# Patient Record
Sex: FEMALE | Race: WHITE | Employment: FULL TIME | URBAN - METROPOLITAN AREA
[De-identification: names, ages, dates, MRNs, and addresses within clinical notes are randomized per-mention and may not be internally consistent; named-entity substitution may affect disease eponyms.]

---

## 2017-07-10 ENCOUNTER — OFFICE VISIT (OUTPATIENT)
Dept: FAMILY MEDICINE CLINIC | Age: 47
End: 2017-07-10

## 2017-07-10 VITALS
HEIGHT: 67 IN | OXYGEN SATURATION: 96 % | BODY MASS INDEX: 32.33 KG/M2 | WEIGHT: 206 LBS | DIASTOLIC BLOOD PRESSURE: 76 MMHG | RESPIRATION RATE: 18 BRPM | HEART RATE: 90 BPM | SYSTOLIC BLOOD PRESSURE: 144 MMHG | TEMPERATURE: 99 F

## 2017-07-10 DIAGNOSIS — E03.9 ACQUIRED HYPOTHYROIDISM: Primary | ICD-10-CM

## 2017-07-10 DIAGNOSIS — N91.2 AMENORRHEA: ICD-10-CM

## 2017-07-10 DIAGNOSIS — L23.7 POISON IVY: ICD-10-CM

## 2017-07-10 NOTE — PROGRESS NOTES
Jayme Beltran is a 50 y.o.  female and presents with    Chief Complaint   Patient presents with    Poison Ivy/Poison Oak/Poison Sumac Exposure    Thyroid Problem      al;so amenorrhea  Worried abouit possible prednancy        Subjective: Additional Concerns: 1. Worried abouit abnormal thyroid and weight gain    2. Skipped period this mo worried about pregnancy vs menopause    3. Has a few spots of poison ivy over 1 wk old           Patient Active Problem List    Diagnosis Date Noted    Hypothyroid 10/17/2011    Depression 10/17/2011    Genital herpes 10/17/2011     Current Outpatient Prescriptions   Medication Sig Dispense Refill    zolpidem (AMBIEN) 10 mg tablet Take 1 Tab by mouth nightly as needed for Sleep. Max Daily Amount: 10 mg. 30 Tab 5    sertraline (ZOLOFT) 100 mg tablet Take 1 Tab by mouth daily. 90 Tab 4    levothyroxine (SYNTHROID) 137 mcg tablet take 1 tablet by mouth once daily BEFORE BREAKFAST 90 Tab 4     No Known Allergies  Past Medical History:   Diagnosis Date    Depression 10/17/2011    Hypothyroid 10/17/2011     No past surgical history on file. Family History   Problem Relation Age of Onset    Heart Disease Mother     Hypertension Mother      Social History   Substance Use Topics    Smoking status: Never Smoker    Smokeless tobacco: Not on file    Alcohol use Yes       ROS       All other systems reviewed and are negative.       Objective:  Vitals:    07/10/17 1333   BP: 144/76   Pulse: 90   Resp: 18   Temp: 99 °F (37.2 °C)   TempSrc: Oral   SpO2: 96%   Weight: 206 lb (93.4 kg)   Height: 5' 7\" (1.702 m)   PainSc:   0 - No pain                 alert, well appearing, and in no distress, oriented to person, place, and time and normal appearing weight  Chest - clear to auscultation, no wheezes, rales or rhonchi, symmetric air entry  Heart - normal rate, regular rhythm, normal S1, S2, no murmurs, rubs, clicks or gallops  Skin - a coupelf of small streaks LABS     TESTS      Assessment/Plan:    Thyroid check labs -- note she is paying for brand name synthoird    menpause vs preg will check labs    poisoin ivy observation only     Lab review:       I have discussed the diagnosis with the patient and the intended plan as seen in the above orders. The patient has received an after-visit summary and questions were answered concerning future plans. I have discussed medication side effects and warnings with the patient as well. I have reviewed the plan of care with the patient, accepted their input and they are in agreement with the treatment goals.      Follow-up Disposition: Not on File

## 2017-07-10 NOTE — MR AVS SNAPSHOT
Visit Information Date & Time Provider Department Dept. Phone Encounter #  
 7/10/2017  1:30 PM Janice Ferrer [phone] [id number] Follow-up Instructions Return if symptoms worsen or fail to improve. Upcoming Health Maintenance Date Due INFLUENZA AGE 9 TO ADULT 8/1/2017 PAP AKA CERVICAL CYTOLOGY 10/13/2018 DTaP/Tdap/Td series (2 - Td) 10/17/2021 Allergies as of 7/10/2017  Review Complete On: 7/10/2017 By: Rosa Jones., DO No Known Allergies Current Immunizations  Reviewed on 10/17/2011 Name Date TDAP Vaccine 10/17/2011 Not reviewed this visit You Were Diagnosed With   
  
 Codes Comments Acquired hypothyroidism    -  Primary ICD-10-CM: E03.9 ICD-9-CM: 244.9 Poison ivy     ICD-10-CM: L23.7 ICD-9-CM: 692.6 Amenorrhea     ICD-10-CM: N91.2 ICD-9-CM: 626.0 Vitals BP Pulse Temp Resp Height(growth percentile) Weight(growth percentile) 144/76 (BP 1 Location: Right arm, BP Patient Position: Sitting) 90 99 °F (37.2 °C) (Oral) 18 5' 7\" (1.702 m) 206 lb (93.4 kg) SpO2 BMI OB Status Smoking Status 96% 32.26 kg/m2 Having regular periods Never Smoker BMI and BSA Data Body Mass Index Body Surface Area  
 32.26 kg/m 2 2.1 m 2 Preferred Pharmacy Pharmacy Name Phone [address] [facility] [phone] Your Updated Medication List  
  
   
This list is accurate as of: 7/10/17  1:44 PM.  Always use your most recent med list.  
  
  
  
  
 levothyroxine 137 mcg tablet Commonly known as:  SYNTHROID  
take 1 tablet by mouth once daily BEFORE BREAKFAST  
  
 sertraline 100 mg tablet Commonly known as:  ZOLOFT Take 1 Tab by mouth daily. zolpidem 10 mg tablet Commonly known as:  AMBIEN Take 1 Tab by mouth nightly as needed for Sleep. Max Daily Amount: 10 mg. Follow-up Instructions Return if symptoms worsen or fail to improve. To-Do List   
 07/10/2017 Lab:  ESTRADIOL   
  
 07/10/2017 Lab:  [facility] [address]   
  
 07/10/2017 Lab:  HCG QL SERUM   
  
 07/10/2017 Lab:  T4, FREE   
  
 07/10/2017 Lab:  TSH 3RD GENERATION Introducing [facility] SERVICES! [facility] introduces [employer] patient portal. Now you can access parts of your medical record, email your doctor's office, and request medication refills online. 1. In your internet browser, go to [email or web address] 2. Click on the First Time User? Click Here link in the Sign In box. You will see the New Member Sign Up page. 3. Enter your [employer] Access Code exactly as it appears below. You will not need to use this code after youve completed the sign-up process. If you do not sign up before the expiration date, you must request a new code. · [employer]ot Access Code: [email or web address] Expires: 10/8/2017  1:44 PM 
 
4. Enter the last four digits of your Social Security Number (xxxx) and Date of Birth (mm/dd/yyyy) as indicated and click Submit. You will be taken to the next sign-up page. 5. Create a e[employer]uelDepot ID. This will be your eFuelDepot login ID and cannot be changed, so think of one that is secure and easy to remember. 6. Create a e[employer]ot password. You can change your password at any time. 7. Enter your Password Reset Question and Answer. This can be used at a later time if you forget your password. 8. Enter your e-mail address. You will receive e-mail notification when new information is available in [address] 9. Click Sign Up. You can now view and download portions of your medical record. 10. Click the Download Summary menu link to download a portable copy of your medical information. If you have questions, please visit the Frequently Asked Questions section of the [employer]ot website. Remember, e[employer]uel[employer]ot is NOT to be used for urgent needs. For medical emergencies, dial 911. Now available from your iPhone and Android! Please provide this summary of care documentation to your next provider. Your primary care clinician is listed as [id number] [facility]. If you have any questions after today's visit, please call [phone].

## 2017-07-10 NOTE — PROGRESS NOTES
Jayme Beltran is a 50 y.o. female presents today for follow up on her thyroid issues. Pt is in Room # 4        1. Have you been to the ER, urgent care clinic since your last visit? Hospitalized since your last visit? No    2. Have you seen or consulted any other health care providers outside of the [address] [place] [address] since your last visit? Include any pap smears or colon screening.  No

## 2017-07-11 ENCOUNTER — TELEPHONE (OUTPATIENT)
Dept: FAMILY MEDICINE CLINIC | Age: 47
End: 2017-07-11

## 2017-07-11 LAB
ESTRADIOL: 61 NG/L
FSH SERPL-ACNC: 5.1 MIU/ML
LH SERPL-ACNC: 4.6 MIU/ML
PREGNANCY-SERUM, 6158: NEGATIVE
T4 FREE SERPL-MCNC: 0.9 NG/DL (ref 0.9–1.8)
TSH SERPL DL<=0.005 MIU/L-ACNC: 2.13 MCU/ML (ref 0.27–4.2)

## 2017-07-11 NOTE — TELEPHONE ENCOUNTER
Spoke with [facility], Verified 2 patient identifiers. Spoke with patient in regards to lab results. Relayed Doctor's notes.   Patient acknowledges understanding and voices no further questions or concerns at this time

## 2017-07-11 NOTE — TELEPHONE ENCOUNTER
Attempted to contact patient Kaylynn Briones regarding lab finding. Call back number left and myself or another nurse will call patient back to relay doctors note.

## 2017-07-11 NOTE — TELEPHONE ENCOUNTER
Notify pt  1. She is not pregnant  2. She is not menopausal  3. Her thyroid shows optimal control. I would recommend she consider a reputable weight loss program such as weight watchers. I would avoid amphetamine prescribers or surgical treatment.      Dr Kenton Izaguirre

## 2017-07-13 ENCOUNTER — OFFICE VISIT (OUTPATIENT)
Dept: OBGYN CLINIC | Age: 47
End: 2017-07-13

## 2017-07-13 VITALS
SYSTOLIC BLOOD PRESSURE: 124 MMHG | HEIGHT: 67 IN | HEART RATE: 84 BPM | WEIGHT: 207 LBS | DIASTOLIC BLOOD PRESSURE: 78 MMHG | BODY MASS INDEX: 32.49 KG/M2

## 2017-07-13 DIAGNOSIS — N93.9 ABNORMAL UTERINE BLEEDING (AUB): Primary | ICD-10-CM

## 2017-07-13 NOTE — PATIENT INSTRUCTIONS
Abnormal Uterine Bleeding: Care Instructions  Your Care Instructions  Abnormal uterine bleeding (AUB) is irregular bleeding from the uterus that is longer or heavier than usual or does not occur at your regular time. Sometimes it is caused by changes in hormone levels. It can also be caused by growths in the uterus, such as fibroids or polyps. Sometimes a cause cannot be found. You may have heavy bleeding when you are not expecting your period. Your doctor may suggest a pregnancy test, if you think you are pregnant. Follow-up care is a key part of your treatment and safety. Be sure to make and go to all appointments, and call your doctor if you are having problems. It's also a good idea to know your test results and keep a list of the medicines you take. How can you care for yourself at home? · Be safe with medicines. Take pain medicines exactly as directed. ¨ If the doctor gave you a prescription medicine for pain, take it as prescribed. ¨ If you are not taking a prescription pain medicine, ask your doctor if you can take an over-the-counter medicine. · You may be low in iron because of blood loss. Eat a balanced diet that is high in iron and vitamin C. Foods rich in iron include red meat, shellfish, eggs, beans, and leafy green vegetables. Talk to your doctor about whether you need to take iron pills or a multivitamin. When should you call for help? Call 911 anytime you think you may need emergency care. For example, call if:  · You passed out (lost consciousness). Call your doctor now or seek immediate medical care if:  · You have sudden, severe pain in your belly or pelvis. · You have severe vaginal bleeding. You are soaking through your usual pads or tampons every hour for 2 or more hours. · You feel dizzy or lightheaded, or you feel like you may faint. Watch closely for changes in your health, and be sure to contact your doctor if:  · You have new belly or pelvic pain.   · You have a fever.  · Your bleeding gets worse or lasts longer than 1 week. · You think you may be pregnant. Where can you learn more? Go to http://rebecca-esperanza.info/. Enter Y366 in the search box to learn more about \"Abnormal Uterine Bleeding: Care Instructions. \"  Current as of: October 13, 2016  Content Version: 11.3  © 7635-3811 [employer]. Care instructions adapted under license by [employer] (which disclaims liability or warranty for this information). If you have questions about a medical condition or this instruction, always ask your healthcare professional. [place] [address] any warranty or liability for your use of this information.

## 2017-07-13 NOTE — PROGRESS NOTES
Subjective:      Reagan Bassett is a 50 y.o. female who complains of AUB, usually has normal menses. LMP 2017, had a home +UPT, but subsequently negative. Serum HCG neg on . States she has been having breast tenderness, nausea, so thinks may be pregnant. Menstrual history:  She had been bleeding regularly, except occurs every 4 weeks and menses are lasting up to 5 days. Dysmenorrhea: none. Cyclic symptoms include none. She denies breast tenderness, bloating and fluid retention    Sexual history: single partner, contraception - rhythm method. Genital HSV  Prior Pap smear:was normal.    OB History      Para Term  AB Living    3 2 2  1 2    SAB TAB Ectopic Molar Multiple Live Births    1             Patient Active Problem List   Diagnosis Code    Hypothyroid E03.9    Depression F32.9    Genital herpes A60.00     Current Outpatient Prescriptions   Medication Sig Dispense Refill    sertraline (ZOLOFT) 100 mg tablet Take 1 Tab by mouth daily. 90 Tab 4    levothyroxine (SYNTHROID) 137 mcg tablet take 1 tablet by mouth once daily BEFORE BREAKFAST 90 Tab 4     No Known Allergies  Past Medical History:   Diagnosis Date    Depression 10/17/2011    Hypothyroid 10/17/2011     Past Surgical History:   Procedure Laterality Date    HX  SECTION      times 2     Family History   Problem Relation Age of Onset    Heart Disease Mother     Hypertension Mother      Social History   Substance Use Topics    Smoking status: Never Smoker    Smokeless tobacco: Not on file    Alcohol use Yes        Review of Systems:   General ROS: negative for fever, chills, malaise  Endocrine ROS: negative for -  breast tenderness/mass/nipple discharge/galactorrhea, hair pattern changes, skin changes or temperature intolerance.     Respiratory ROS: no cough, shortness of breath, or wheezing  Cardiovascular ROS: no chest pain or dyspnea on exertion  Gastrointestinal ROS: no abdominal pain, change in bowel habits, or black or bloody stools, nausea, vomiting  Genito-Urinary ROS: no dysuria, trouble voiding, incontinence or hematuria. No pelvic pain, abnormal bleeding, unusual discharge, vaginal dryness  Musculoskeletal ROS: negative  Neurological ROS: no TIA or stroke symptoms  Dermatological ROS: negative       Objective:     Visit Vitals    /78    Pulse 84    Ht 5' 7\" (1.702 m)    Wt 207 lb (93.9 kg)    LMP 05/09/2017    BMI 32.42 kg/m2      Physical Exam:   General appearance - alert, well appearing, and in no distress, oriented to person, place, and time  Mental status - alert, oriented to person, place, and time, normal mood, behavior, speech, dress, motor activity, and thought processes  Neck:  No lymphadenopathy, no thyroid enlargement/tenderness  Respiratory:  Normal respiratory effort, no intercostal retractions or use of accessory muscles. Abdomen - soft, nontender, nondistended, no masses or organomegaly  Pelvic - No inguinal lymphadenopathy, normal urethral meatus and no bladder tenderness. VULVA: normal appearing vulva with no masses, tenderness or lesions,   VAGINA: normal appearing vagina with normal color and discharge, no lesions,   PELVIC FLOOR EXAM: no cystocele, rectocele or prolapse noted  CERVIX: normal appearing cervix without discharge or lesions, mild bleeding noted  UTERUS: uterus is normal size, shape, consistency and nontender, mobile,   ADNEXA: normal adnexa in size, nontender and no masses. Extremities - No edema. Skin - normal coloration and turgor, no rashes, no suspicious skin lesions noted    Assessment/Plan:       ICD-10-CM ICD-9-CM    1. Abnormal uterine bleeding (AUB) N93.9 626.9 US PELV NON OB W TV     lab results and schedule of future lab studies reviewed with patient     Menses starting now during exam.  Most likely going through perimenopausal transition. Reassurance given. Advised to call if menses are heavy, which an EMB would be indicated.   Will patient with US results. RTO PRN. Discussed with patient that our office will call for abnormal lab results. Normal results can be reviewed through [employer]. If patient has not received a phone call from our office or there are no results found on MyChart, the patient has been instructed to call our office to follow up on results. I have verbalized the plan of care with patient. The patient was given a full opportunity to ask questions, indicated that her questions had been answered and expressed understanding.

## 2017-07-19 ENCOUNTER — HOSPITAL ENCOUNTER (OUTPATIENT)
Dept: ULTRASOUND IMAGING | Age: 47
Discharge: HOME | End: 2017-07-19
Attending: OBSTETRICS & GYNECOLOGY
Payer: COMMERCIAL

## 2017-07-19 DIAGNOSIS — N93.9 ABNORMAL UTERINE BLEEDING (AUB): ICD-10-CM

## 2017-07-19 PROCEDURE — 76830 TRANSVAGINAL US NON-OB: CPT

## 2017-07-19 NOTE — LETTER
7/25/2017 3:33 PM 
 
Ms. Serafin Hull [address] [facility] [address] Dear Serafin Hull I have reviewed your results and have found the results listed below to be within normal ranges. [employer] : NORMAL/STABLE My recommendations are as follows: 
None. Keep up the good work! Please call if you have any questions [phone] . Sincerely, 
 
 
Johanna Pabon

## 2017-07-20 NOTE — PROGRESS NOTES
Possible submucosal fibroid noted. If asymptomatic, no mgmt necessary. REINIER Siddiqi to call patient.

## 2017-12-07 ENCOUNTER — OFFICE VISIT (OUTPATIENT)
Dept: FAMILY MEDICINE CLINIC | Age: 47
End: 2017-12-07

## 2017-12-07 VITALS
DIASTOLIC BLOOD PRESSURE: 74 MMHG | BODY MASS INDEX: 32.77 KG/M2 | OXYGEN SATURATION: 95 % | TEMPERATURE: 97.3 F | WEIGHT: 208.8 LBS | RESPIRATION RATE: 18 BRPM | SYSTOLIC BLOOD PRESSURE: 113 MMHG | HEIGHT: 67 IN | HEART RATE: 72 BPM

## 2017-12-07 DIAGNOSIS — R09.89 CHEST CONGESTION: ICD-10-CM

## 2017-12-07 DIAGNOSIS — E03.9 ACQUIRED HYPOTHYROIDISM: ICD-10-CM

## 2017-12-07 DIAGNOSIS — F32.0 MILD SINGLE CURRENT EPISODE OF MAJOR DEPRESSIVE DISORDER (HCC): ICD-10-CM

## 2017-12-07 DIAGNOSIS — Z00.00 ROUTINE GENERAL MEDICAL EXAMINATION AT A HEALTH CARE FACILITY: Primary | ICD-10-CM

## 2017-12-07 DIAGNOSIS — J02.9 SORE THROAT: ICD-10-CM

## 2017-12-07 DIAGNOSIS — N95.1 PERIMENOPAUSAL: ICD-10-CM

## 2017-12-07 LAB
S PYO AG THROAT QL: NEGATIVE
VALID INTERNAL CONTROL?: YES

## 2017-12-07 RX ORDER — SERTRALINE HYDROCHLORIDE 100 MG/1
100 TABLET, FILM COATED ORAL DAILY
Qty: 90 TAB | Refills: 4 | Status: SHIPPED | OUTPATIENT
Start: 2017-12-07 | End: 2018-08-15

## 2017-12-07 RX ORDER — LEVOTHYROXINE SODIUM 137 UG/1
137 TABLET ORAL
Qty: 90 TAB | Refills: 4 | Status: SHIPPED | OUTPATIENT
Start: 2017-12-07 | End: 2018-06-21

## 2017-12-07 NOTE — PROGRESS NOTES
Antonella Fajardo is a 50 y.o. female presents today for her complete physical exam. Patient reports having some sinus pressure for a month. Patient reports of some chest congestion with mucus, no chills or fever. Patient does report some sore throat due to the post nasal drip. Pt is in Room # 4      Learning Assessment (baseline): Completed  Depression Screening: Completed      1. Have you been to the ER, urgent care clinic since your last visit? Hospitalized since your last visit? No    2. Have you seen or consulted any other health care providers outside of the [address] [place] [address] since your last visit? Include any pap smears or colon screening.  No

## 2017-12-07 NOTE — PROGRESS NOTES
Tyra Rdo is a 50 y.o.  female and presents for a preventive health care visit   Subjective:  Health Maintenance History  Immunizations reviewed, refused flu  indicated. Mammogram : ordered , dexascan: na ,pap smear : na ,   colonoscopy: na , Eye exam: utd ,  Chest CT: na ,    HPI ;    Patient Active Problem List    Diagnosis Date Noted    Perimenopausal 2017    Hypothyroid 10/17/2011    Depression 10/17/2011    Genital herpes 10/17/2011     Current Outpatient Prescriptions   Medication Sig Dispense Refill    sertraline (ZOLOFT) 100 mg tablet Take 1 Tab by mouth daily.  90 Tab 4    levothyroxine (SYNTHROID) 137 mcg tablet take 1 tablet by mouth once daily BEFORE BREAKFAST 90 Tab 4     No Known Allergies  Past Medical History:   Diagnosis Date    Depression 10/17/2011    Hypothyroid 10/17/2011     Past Surgical History:   Procedure Laterality Date    HX  SECTION      times 2     Family History   Problem Relation Age of Onset    Heart Disease Mother     Hypertension Mother      Social History   Substance Use Topics    Smoking status: Never Smoker    Smokeless tobacco: Not on file    Alcohol use Yes       ROS       General: negative for - chills, fatigue, fever, weight change  Psych: negative for - anxiety, depression, irritability or mood swings  ENT: negative for - headaches, hearing change, nasal congestion, oral lesions, sneezing or sore throat  Heme/ Lymph: negative for - bleeding problems, bruising, pallor or swollen lymph nodes  Endo: negative for - hot flashes, polydipsia/polyuria or temperature intolerance  Resp: negative for - cough, shortness of breath or wheezing  CV: negative for - chest pain, edema or palpitations  GI: negative for - abdominal pain, change in bowel habits, constipation, diarrhea or nausea/vomiting  : negative for - dysuria, hematuria, incontinence, pelvic pain or vulvar/vaginal symptoms  MSK: negative for - joint pain, joint swelling or muscle pain  Neuro: negative for - confusion, headaches, seizures or weakness  Derm: negative for - dry skin, hair changes, rash or skin lesion changes      Objective:  Vitals:    12/07/17 0750   BP: 113/74   Pulse: 72   Resp: 18   Temp: 97.3 °F (36.3 °C)   TempSrc: Oral   SpO2: 95%   Weight: 208 lb 12.8 oz (94.7 kg)   Height: 5' 7\" (1.702 m)   PainSc:   3   PainLoc: Nose   LMP: 11/15/2017       alert, well appearing, and in no distress, oriented to person, place, and time and normal appearing weight  Mental status - alert, oriented to person, place, and time  Eyes - pupils equal and reactive, extraocular eye movements intact  Ears - bilateral TM's and external ear canals normal  Nose - normal and patent, no erythema, discharge or polyps  Mouth - mucous membranes moist, pharynx normal without lesions  Neck - supple, no significant adenopathy  Lymphatics - no palpable lymphadenopathy, no hepatosplenomegaly  Chest - clear to auscultation, no wheezes, rales or rhonchi, symmetric air entry  Heart - normal rate, regular rhythm, normal S1, S2, no murmurs, rubs, clicks or gallops  Abdomen - soft, nontender, nondistended, no masses or organomegaly  Back exam - full range of motion, no tenderness, palpable spasm or pain on motion  Neurological - alert, oriented, normal speech, no focal findings or movement disorder noted  Musculoskeletal - no joint tenderness, deformity or swelling  Extremities - peripheral pulses normal, no pedal edema, no clubbing or cyanosis  Skin - normal coloration and turgor, no rashes, no suspicious skin lesions noted        LABS  Ordered   TESTS      Assessment/Plan:    Health Maintenance up to date. Recommend f/u physical 1 year. Routine screening labs/tests recommended prior to next physical.              I have discussed the diagnosis with the patient and the intended plan as seen in the above orders. The patient has received an after-visit summary and questions were answered concerning future plans. I have discussed medication side effects and warnings with the patient as well. I have reviewed the plan of care with the patient, accepted their input and they are in agreement with the treatment goals. Follow-up Disposition:  Return in about 6 months (around 6/7/2018) for rov, labs at next visit.        -------------------------------------------------------------------------------------------------------------------    Problem Assessment  and also with   Chief Complaint   Patient presents with    Other     perimenopausal    Depression    Thyroid Problem    Allergic Rhinitis         HPI ; Thyroid Review:  Patient is seen for followup of hypothyroidism. Thyroid ROS: denies fatigue, weight changes, heat/cold intolerance, bowel/skin changes or CVS symptoms. Depression Review:  Patient is seen for followup of depression. Treatment includes Zoloft and no other therapies. Ongoing symptoms include depressed mood. She denies hypersomnia. She experiences the following side effects from the treatment: none. Allergies -- ongoing chronic worried about uri   ongiong perimenopause -- still bleding occ           Additional Concerns:  Strep and flu neg today            Assessment/Plan:    1. Thyroid taking brand name synthroid feeling better check albs f/u if abn  2. Perimenopause no tx at this time  3. depressin stable  4. Allergies seasonal       Diagnoses and all orders for this visit:    1. Sore throat  -     AMB POC RAPID INFLUENZA TEST  -     AMB POC RAPID STREP A  -     LIPID PANEL; Future  -     CBC WITH AUTOMATED DIFF; Future  -     METABOLIC PANEL, COMPREHENSIVE; Future  -     TSH 3RD GENERATION; Future  -     URINALYSIS W/ RFLX MICROSCOPIC; Future  -     T4, FREE; Future  -     ALICIA MAMMO BI SCREENING INCL CAD; Future    2. Chest congestion  -     AMB POC RAPID INFLUENZA TEST  -     AMB POC RAPID STREP A  -     LIPID PANEL; Future  -     CBC WITH AUTOMATED DIFF;  Future  -     METABOLIC PANEL, COMPREHENSIVE; Future  -     TSH 3RD GENERATION; Future  -     URINALYSIS W/ RFLX MICROSCOPIC; Future  -     T4, FREE; Future  -     ALICIA MAMMO BI SCREENING INCL CAD; Future    3. Acquired hypothyroidism  -     AMB POC RAPID INFLUENZA TEST  -     AMB POC RAPID STREP A  -     LIPID PANEL; Future  -     CBC WITH AUTOMATED DIFF; Future  -     METABOLIC PANEL, COMPREHENSIVE; Future  -     TSH 3RD GENERATION; Future  -     URINALYSIS W/ RFLX MICROSCOPIC; Future  -     T4, FREE; Future  -     ALICIA MAMMO BI SCREENING INCL CAD; Future    4. Mild single current episode of major depressive disorder (HCC)  -     AMB POC RAPID INFLUENZA TEST  -     AMB POC RAPID STREP A  -     LIPID PANEL; Future  -     CBC WITH AUTOMATED DIFF; Future  -     METABOLIC PANEL, COMPREHENSIVE; Future  -     TSH 3RD GENERATION; Future  -     URINALYSIS W/ RFLX MICROSCOPIC; Future  -     T4, FREE; Future  -     ALICIA MAMMO BI SCREENING INCL CAD; Future    5. Perimenopausal  -     AMB POC RAPID INFLUENZA TEST  -     AMB POC RAPID STREP A  -     LIPID PANEL; Future  -     CBC WITH AUTOMATED DIFF; Future  -     METABOLIC PANEL, COMPREHENSIVE; Future  -     TSH 3RD GENERATION; Future  -     URINALYSIS W/ RFLX MICROSCOPIC; Future  -     T4, FREE; Future  -     ALICIA MAMMO BI SCREENING INCL CAD; Future    6. Routine general medical examination at a health care facility  -     AMB POC RAPID INFLUENZA TEST  -     AMB POC RAPID STREP A  -     LIPID PANEL; Future  -     CBC WITH AUTOMATED DIFF; Future  -     METABOLIC PANEL, COMPREHENSIVE; Future  -     TSH 3RD GENERATION; Future  -     URINALYSIS W/ RFLX MICROSCOPIC; Future  -     T4, FREE; Future  -     ALICIA MAMMO BI SCREENING INCL CAD;  Future          Lab review: orders written for new lab studies as appropriate; see orders

## 2017-12-08 LAB
A-G RATIO,AGRAT: 1.7 RATIO (ref 1.1–2.6)
ABSOLUTE LYMPHOCYTE COUNT, 10803: 1.9 K/UL (ref 1–4.8)
ALBUMIN SERPL-MCNC: 4.4 G/DL (ref 3.5–5)
ALP SERPL-CCNC: 70 U/L (ref 25–115)
ALT SERPL-CCNC: 14 U/L (ref 5–40)
ANION GAP SERPL CALC-SCNC: 16 MMOL/L
AST SERPL W P-5'-P-CCNC: 11 U/L (ref 10–37)
BASOPHILS # BLD: 0 K/UL (ref 0–0.2)
BASOPHILS NFR BLD: 1 % (ref 0–2)
BILIRUB SERPL-MCNC: 0.2 MG/DL (ref 0.2–1.2)
BILIRUB UR QL: NEGATIVE
BUN SERPL-MCNC: 16 MG/DL (ref 6–22)
CALCIUM SERPL-MCNC: 9.4 MG/DL (ref 8.4–10.5)
CHLORIDE SERPL-SCNC: 98 MMOL/L (ref 98–110)
CHOLEST SERPL-MCNC: 226 MG/DL (ref 110–200)
CO2 SERPL-SCNC: 24 MMOL/L (ref 20–32)
CREAT SERPL-MCNC: 0.7 MG/DL (ref 0.5–1.2)
EOSINOPHIL # BLD: 0.3 K/UL (ref 0–0.5)
EOSINOPHIL NFR BLD: 4 % (ref 0–6)
ERYTHROCYTE [DISTWIDTH] IN BLOOD BY AUTOMATED COUNT: 14.2 % (ref 10–16)
GFRAA, 66117: >60
GFRNA, 66118: >60
GLOBULIN,GLOB: 2.6 G/DL (ref 2–4)
GLUCOSE SERPL-MCNC: 95 MG/DL (ref 70–99)
GLUCOSE UR QL: NEGATIVE MG/DL
GRANULOCYTES,GRANS: 62 % (ref 40–75)
HCT VFR BLD AUTO: 44.1 % (ref 35.1–48)
HDLC SERPL-MCNC: 52 MG/DL (ref 40–59)
HGB BLD-MCNC: 13.9 G/DL (ref 11.7–16)
HGB UR QL STRIP: NEGATIVE
KETONES UR QL STRIP.AUTO: NEGATIVE MG/DL
LDLC SERPL CALC-MCNC: 144 MG/DL (ref 50–99)
LEUKOCYTE ESTERASE: NEGATIVE
LYMPHOCYTES, LYMLT: 24 % (ref 27–45)
MCH RBC QN AUTO: 30 PG (ref 26–34)
MCHC RBC AUTO-ENTMCNC: 32 G/DL (ref 32–36)
MCV RBC AUTO: 95 FL (ref 80–95)
MONOCYTES # BLD: 0.7 K/UL (ref 0.1–0.9)
MONOCYTES NFR BLD: 9 % (ref 3–9)
NEUTROPHILS # BLD AUTO: 5 K/UL (ref 1.8–7.7)
NITRITE UR QL STRIP.AUTO: NEGATIVE
PH UR STRIP: 5 PH (ref 5–8)
PLATELET # BLD AUTO: 291 K/UL (ref 140–440)
PMV BLD AUTO: 11.2 FL (ref 6–10.8)
POTASSIUM SERPL-SCNC: 4.5 MMOL/L (ref 3.5–5.5)
PROT SERPL-MCNC: 7 G/DL (ref 6.4–8.3)
PROT UR QL STRIP: NEGATIVE MG/DL
QUICKVUE INFLUENZA TEST: NEGATIVE
RBC # BLD AUTO: 4.64 M/UL (ref 3.8–5.2)
SODIUM SERPL-SCNC: 138 MMOL/L (ref 133–145)
SP GR UR: 1.02 (ref 1–1.03)
T4 FREE SERPL-MCNC: 0.9 NG/DL (ref 0.9–1.8)
TRIGL SERPL-MCNC: 147 MG/DL (ref 40–149)
TSH SERPL DL<=0.005 MIU/L-ACNC: 14.11 MCU/ML (ref 0.27–4.2)
UROBILINOGEN UR STRIP-MCNC: <2 MG/DL
VALID INTERNAL CONTROL?: YES
VLDLC SERPL CALC-MCNC: 29 MG/DL (ref 8–30)
WBC # BLD AUTO: 8 K/UL (ref 4–11)

## 2017-12-12 DIAGNOSIS — E03.0 CONGENITAL HYPOTHYROIDISM WITH DIFFUSE GOITER: ICD-10-CM

## 2017-12-13 RX ORDER — LEVOTHYROXINE SODIUM 137 UG/1
TABLET ORAL
Qty: 90 TAB | Refills: 4 | Status: SHIPPED | OUTPATIENT
Start: 2017-12-13 | End: 2018-06-21 | Stop reason: DRUGHIGH

## 2018-06-21 ENCOUNTER — OFFICE VISIT (OUTPATIENT)
Dept: FAMILY MEDICINE CLINIC | Age: 48
End: 2018-06-21

## 2018-06-21 VITALS
HEART RATE: 83 BPM | HEIGHT: 67 IN | WEIGHT: 212.4 LBS | BODY MASS INDEX: 33.34 KG/M2 | TEMPERATURE: 97.5 F | OXYGEN SATURATION: 98 % | SYSTOLIC BLOOD PRESSURE: 113 MMHG | RESPIRATION RATE: 16 BRPM | DIASTOLIC BLOOD PRESSURE: 71 MMHG

## 2018-06-21 DIAGNOSIS — E03.9 ACQUIRED HYPOTHYROIDISM: Primary | ICD-10-CM

## 2018-06-21 RX ORDER — LEVOTHYROXINE SODIUM 150 UG/1
150 TABLET ORAL
Qty: 90 TAB | Refills: 4 | Status: SHIPPED | OUTPATIENT
Start: 2018-06-21 | End: 2018-10-29 | Stop reason: DRUGHIGH

## 2018-06-21 NOTE — PROGRESS NOTES
Nancy Russo is a 46 y.o.  female and presents with    Chief Complaint   Patient presents with    Thyroid Problem    Depression           Subjective: Worried aobut weight gain  Has been taking brand name only synthroid 137/d  Has stopped zoloft again worreid about weight gain  Gets lots of exercise and very careful about her diet        Additional Concerns:          Patient Active Problem List    Diagnosis Date Noted    Perimenopausal 2017    Hypothyroid 10/17/2011    Depression 10/17/2011    Genital herpes 10/17/2011     Current Outpatient Prescriptions   Medication Sig Dispense Refill    levothyroxine (SYNTHROID) 150 mcg tablet Take 1 Tab by mouth Daily (before breakfast). Brand name synthroid only  Indications: hypothyroidism 90 Tab 4    sertraline (ZOLOFT) 100 mg tablet Take 1 Tab by mouth daily. 90 Tab 4     No Known Allergies  Past Medical History:   Diagnosis Date    Depression 10/17/2011    Hypothyroid 10/17/2011     Past Surgical History:   Procedure Laterality Date    HX  SECTION      times 2     Family History   Problem Relation Age of Onset    Heart Disease Mother     Hypertension Mother      Social History   Substance Use Topics    Smoking status: Never Smoker    Smokeless tobacco: Never Used    Alcohol use Yes       ROS       All other systems reviewed and are negative.       Objective:  Vitals:    18 0937   BP: 113/71   Pulse: 83   Resp: 16   Temp: 97.5 °F (36.4 °C)   TempSrc: Oral   SpO2: 98%   Weight: 212 lb 6.4 oz (96.3 kg)   Height: 5' 7\" (1.702 m)   PainSc:   0 - No pain   LMP: 2018                 alert, well appearing, and in no distress, oriented to person, place, and time and overweight  Chest - clear to auscultation, no wheezes, rales or rhonchi, symmetric air entry  Heart - normal rate, regular rhythm, normal S1, S2, no murmurs, rubs, clicks or gallops  Abdomen - soft, nontender, nondistended, no masses or organomegaly        LABS TESTS      Assessment/Plan:    Hypothyroid wieth weight gain. Will try incrasing to 150mcg/d brand name for next 3 mo and then repeat labs  If weight still a problem consider belviq or saxenda      Lab review: labs are reviewed, up to date and normal    Diagnoses and all orders for this visit:    1. Acquired hypothyroidism  -     levothyroxine (SYNTHROID) 150 mcg tablet; Take 1 Tab by mouth Daily (before breakfast). Brand name synthroid only  Indications: hypothyroidism  -     TSH 3RD GENERATION; Future  -     T4, FREE; Future  -     METABOLIC PANEL, COMPREHENSIVE; Future          I have discussed the diagnosis with the patient and the intended plan as seen in the above orders. The patient has received an after-visit summary and questions were answered concerning future plans. I have discussed medication side effects and warnings with the patient as well. I have reviewed the plan of care with the patient, accepted their input and they are in agreement with the treatment goals. Follow-up Disposition:  Return in about 3 months (around 9/21/2018) for labs prior, EOV.

## 2018-06-21 NOTE — MR AVS SNAPSHOT
[address] [id number] [facility] [address] [place] [phone] 
[phone] Patient: Zay Pyle MRN: [id number] GALINA:[birth year] Visit Information Date & Time Provider Department Dept. Phone Encounter #  
 6/21/2018  9:30 AM Janice Ferrer [phone] [id number] Follow-up Instructions Return in about 3 months (around 9/21/2018) for labs prior, EOV. Follow-up and Disposition History Upcoming Health Maintenance Date Due  
 BREAST CANCER SCRN MAMMOGRAM 6/14/2018 FOBT Q 1 YEAR AGE 50-75 6/14/2018 PAP AKA CERVICAL CYTOLOGY 10/13/2018 Influenza Age 5 to Adult 8/1/2018 DTaP/Tdap/Td series (2 - Td) 10/17/2021 Allergies as of 6/21/2018  Review Complete On: 6/21/2018 By: Gordo Min., DO No Known Allergies Current Immunizations  Reviewed on 10/17/2011 Name Date TDAP Vaccine 10/17/2011 Not reviewed this visit You Were Diagnosed With   
  
 Codes Comments Acquired hypothyroidism    -  Primary ICD-10-CM: E03.9 ICD-9-CM: 303. 9 Vitals BP Pulse Temp Resp Height(growth percentile) Weight(growth percentile) 113/71 (BP 1 Location: Right arm, BP Patient Position: Sitting) 83 97.5 °F (36.4 °C) (Oral) 16 5' 7\" (1.702 m) 212 lb 6.4 oz (96.3 kg) LMP SpO2 BMI OB Status Smoking Status 04/19/2018 (Approximate) 98% 33.27 kg/m2 Having regular periods Never Smoker BMI and BSA Data Body Mass Index Body Surface Area  
 33.27 kg/m 2 2.13 m 2 Preferred Pharmacy Pharmacy Name Phone [address], [address] [facility] [phone] Your Updated Medication List  
  
   
This list is accurate as of 6/21/18  9:58 AM.  Always use your most recent med list.  
  
  
  
  
 levothyroxine 150 mcg tablet Commonly known as:  SYNTHROID Take 1 Tab by mouth Daily (before breakfast).  Brand name synthroid only Indications: hypothyroidism  
  
 sertraline 100 mg tablet Commonly known as:  ZOLOFT Take 1 Tab by mouth daily. Prescriptions Sent to Pharmacy Refills  
 levothyroxine (SYNTHROID) 150 mcg tablet 4 Sig: Take 1 Tab by mouth Daily (before breakfast). Brand name synthroid only  Indications: hypothyroidism Class: Normal  
 Pharmacy: [address], [address]  #: [phone] Route: Oral  
  
Follow-up Instructions Return in about 3 months (around 9/21/2018) for labs prior, EOV. To-Do List   
 Around 09/19/2018 Lab:  METABOLIC PANEL, COMPREHENSIVE Around 09/19/2018 Lab:  T4, FREE Around 09/19/2018 Lab:  TSH 3RD GENERATION Introducing [facility] SERVICES! Lucia Mascorro introduces GoYoDeo patient portal. Now you can access parts of your medical record, email your doctor's office, and request medication refills online. 1. In your internet browser, go to [email or web address] 2. Click on the First Time User? Click Here link in the Sign In box. You will see the New Member Sign Up page. 3. Enter your GoYoDeo Access Code exactly as it appears below. You will not need to use this code after youve completed the sign-up process. If you do not sign up before the expiration date, you must request a new code. · GoYoDeo Access Code: [email or web address] Expires: 9/19/2018  9:30 AM 
 
4. Enter the last four digits of your Social Security Number (xxxx) and Date of Birth (mm/dd/yyyy) as indicated and click Submit. You will be taken to the next sign-up page. 5. Create a GoYoDeo ID. This will be your GoYoDeo login ID and cannot be changed, so think of one that is secure and easy to remember. 6. Create a GoYoDeo password. You can change your password at any time. 7. Enter your Password Reset Question and Answer. This can be used at a later time if you forget your password. 8. Enter your e-mail address. You will receive e-mail notification when new information is available in [address] 9. Click Sign Up. You can now view and download portions of your medical record. 10. Click the Download Summary menu link to download a portable copy of your medical information. If you have questions, please visit the Frequently Asked Questions section of the [employer] website. Remember, [employer] is NOT to be used for urgent needs. For medical emergencies, dial 911. Now available from your iPhone and Android! Please provide this summary of care documentation to your next provider. Your primary care clinician is listed as [id number] [facility]. If you have any questions after today's visit, please call [phone].

## 2018-06-21 NOTE — PROGRESS NOTES
José Miguel Marks is a 46 y.o. female presents today for follow up on her thyroid. Patient reports of no pain or new concerns at this time. Pt is in Room # 4        1. Have you been to the ER, urgent care clinic since your last visit? Hospitalized since your last visit? No    2. Have you seen or consulted any other health care providers outside of the [address] [place] [address] since your last visit? Include any pap smears or colon screening. No     Health Maintenance reviewed - .

## 2018-07-06 ENCOUNTER — TELEPHONE (OUTPATIENT)
Dept: FAMILY MEDICINE CLINIC | Age: 48
End: 2018-07-06

## 2018-07-06 DIAGNOSIS — E03.9 ACQUIRED HYPOTHYROIDISM: Primary | ICD-10-CM

## 2018-07-06 NOTE — TELEPHONE ENCOUNTER
Patient would like to be referred to the following endocrinologist      [facility]  [place] [address] [place] [address],  [facility], [address] [place] [address] Ln    [phone]  Fax [phone]

## 2018-08-15 ENCOUNTER — OFFICE VISIT (OUTPATIENT)
Dept: FAMILY MEDICINE CLINIC | Age: 48
End: 2018-08-15

## 2018-08-15 VITALS
RESPIRATION RATE: 20 BRPM | OXYGEN SATURATION: 98 % | WEIGHT: 207 LBS | TEMPERATURE: 97.7 F | SYSTOLIC BLOOD PRESSURE: 119 MMHG | HEIGHT: 67 IN | HEART RATE: 98 BPM | BODY MASS INDEX: 32.49 KG/M2 | DIASTOLIC BLOOD PRESSURE: 75 MMHG

## 2018-08-15 DIAGNOSIS — E03.9 ACQUIRED HYPOTHYROIDISM: Primary | ICD-10-CM

## 2018-08-15 DIAGNOSIS — N95.1 PERIMENOPAUSAL: ICD-10-CM

## 2018-08-15 DIAGNOSIS — F32.0 CURRENT MILD EPISODE OF MAJOR DEPRESSIVE DISORDER WITHOUT PRIOR EPISODE (HCC): ICD-10-CM

## 2018-08-15 RX ORDER — ALPRAZOLAM 0.5 MG/1
0.5 TABLET ORAL
Qty: 15 TAB | Refills: 0 | Status: SHIPPED | OUTPATIENT
Start: 2018-08-15 | End: 2018-08-27

## 2018-08-15 NOTE — PROGRESS NOTES
Aaron Monteiro is a 46 y.o.  female and presents with    Chief Complaint   Patient presents with    Thyroid Problem           Subjective:  \  Thyroid Review:  Patient is seen for followup of hypothyroidism. Thyroid ROS: weight gain. Additional Concerns: also having a lot of PMS --     Also worried about recurrent depression             Patient Active Problem List    Diagnosis Date Noted    Perimenopausal 2017    Hypothyroid 10/17/2011    Depression 10/17/2011    Genital herpes 10/17/2011     Current Outpatient Prescriptions   Medication Sig Dispense Refill    levothyroxine (SYNTHROID) 150 mcg tablet Take 1 Tab by mouth Daily (before breakfast). Brand name synthroid only  Indications: hypothyroidism 80 Tab 4     No Known Allergies  Past Medical History:   Diagnosis Date    Depression 10/17/2011    Hypothyroid 10/17/2011     Past Surgical History:   Procedure Laterality Date    HX  SECTION      times 2     Family History   Problem Relation Age of Onset    Heart Disease Mother     Hypertension Mother      Social History   Substance Use Topics    Smoking status: Never Smoker    Smokeless tobacco: Never Used    Alcohol use Yes       ROS       All other systems reviewed and are negative. Objective:  Vitals:    08/15/18 1503   BP: 119/75   Pulse: 98   Resp: 20   Temp: 97.7 °F (36.5 °C)   TempSrc: Oral   SpO2: 98%   Weight: 207 lb (93.9 kg)   Height: 5' 7\" (1.702 m)   PainSc:   0 - No pain   LMP: 2018                 alert, well appearing, and in no distress, oriented to person, place, and time and overweight  Chest - clear to auscultation, no wheezes, rales or rhonchi, symmetric air entry  Heart - normal rate, regular rhythm, normal S1, S2, no murmurs, rubs, clicks or gallops  Abdomen - soft, nontender, nondistended, no masses or organomegaly        LABS     TESTS      Assessment/Plan:    1. Thyroid has been seen by endo -- no real change.   I rec continue synthroid and recheck levels in a few months     2. Perimenopause -- will give a few xanax for when she is ahving a rough night. 3. Depression she doesn't want anything at this time. Will keep an eye on her. Consider SNRI if she relapses. Lab review: labs are reviewed, up to date and normal    Diagnoses and all orders for this visit:    1. Acquired hypothyroidism    2. Current mild episode of major depressive disorder without prior episode ([facility] [address]    3. Perimenopausal          I have discussed the diagnosis with the patient and the intended plan as seen in the above orders. The patient has received an after-visit summary and questions were answered concerning future plans. I have discussed medication side effects and warnings with the patient as well. I have reviewed the plan of care with the patient, accepted their input and they are in agreement with the treatment goals. Follow-up Disposition:  Return in about 6 months (around 2/15/2019) for maye.

## 2018-08-15 NOTE — PROGRESS NOTES
Room #      SUBJECTIVE:    Amairani Ren is a 46 y.o. female who presents today for anxiety    1. Have you been to the ER, urgent care clinic since your last visit? Hospitalized since your last visit? NO    2. Have you seen or consulted any other health care providers outside of the [address] [place] [address] since your last visit? Include any pap smears or colon screening. NO  When :  Reason:    Health Maintenance reviewed Yes    Health Maintenance Due   Topic Date Due    BREAST CANCER SCRN MAMMOGRAM  06/14/2018    FOBT Q 1 YEAR AGE 50-75  06/14/2018    Influenza Age 9 to Adult  08/01/2018    PAP AKA CERVICAL CYTOLOGY  10/13/2018

## 2018-08-15 NOTE — MR AVS SNAPSHOT
[address] [facility] 
 
 
 [address] [facility] [address] [facility] [id number] 
[phone] Patient: Aissatou Jim MRN: [id number] AJL:2/74/2007 Visit Information Date & Time Provider Department Dept. Phone Encounter #  
 8/15/2018  3:00 PM Jeri Epps, [address] [facility] [phone] [id number] Follow-up Instructions Return in about 4 months (around 12/15/2018) for physical, labs at next visit, EKG next visit. Follow-up and Disposition History Your Appointments 9/20/2018 10:00 AM  
ROUTINE CARE with Jeri Childs.,  [address] [address]) Appt Note: Return in about 3 months (around 9/21/2018) for labs prior, EOV. [address] [place] [address] [facility] [address] [facility]  
  
   
 [address] [facility] [address] Upcoming Health Maintenance Date Due  
 BREAST CANCER SCRN MAMMOGRAM 6/14/2018 FOBT Q 1 YEAR AGE 50-75 6/14/2018 Influenza Age 5 to Adult 8/1/2018 PAP AKA CERVICAL CYTOLOGY 10/13/2018 DTaP/Tdap/Td series (2 - Td) 10/17/2021 Allergies as of 8/15/2018  Review Complete On: 8/15/2018 By: Jeri Epps DO No Known Allergies Current Immunizations  Reviewed on 10/17/2011 Name Date TDAP Vaccine 10/17/2011 Not reviewed this visit You Were Diagnosed With   
  
 Codes Comments Acquired hypothyroidism    -  Primary ICD-10-CM: E03.9 ICD-9-CM: 244.9 Current mild episode of major depressive disorder without prior episode ([facility] [address]     ICD-10-CM: F32.0 ICD-9-CM: 296.21 Perimenopausal     ICD-10-CM: N95.1 ICD-9-CM: 627.2 Vitals BP Pulse Temp Resp Height(growth percentile) Weight(growth percentile) 119/75 (BP 1 Location: Right arm, BP Patient Position: Sitting) 98 97.7 °F (36.5 °C) (Oral) 20 5' 7\" (1.702 m) 207 lb (93.9 kg) LMP SpO2 BMI OB Status Smoking Status 08/07/2018 (Exact Date) 98% 32.42 kg/m2 Having regular periods Never Smoker BMI and BSA Data Body Mass Index Body Surface Area  
 32.42 kg/m 2 2.11 m 2 Preferred Pharmacy Pharmacy Name Phone [address], [address] [phone] Your Updated Medication List  
  
   
This list is accurate as of 8/15/18  3:34 PM.  Always use your most recent med list.  
  
  
  
  
 ALPRAZolam 0.5 mg tablet Commonly known as:  Cris Hearing Take 1 Tab by mouth nightly as needed for Anxiety. Max Daily Amount: 0.5 mg.  
  
 levothyroxine 150 mcg tablet Commonly known as:  SYNTHROID Take 1 Tab by mouth Daily (before breakfast). Brand name synthroid only  Indications: hypothyroidism Prescriptions Printed Refills ALPRAZolam (XANAX) 0.5 mg tablet 0 Sig: Take 1 Tab by mouth nightly as needed for Anxiety. Max Daily Amount: 0.5 mg.  
 Class: Print Route: Oral  
  
Follow-up Instructions Return in about 4 months (around 12/15/2018) for physical, labs at next visit, EKG next visit. Introducing [facility]! [facility] introduces Fingerprint patient portal. Now you can access parts of your medical record, email your doctor's office, and request medication refills online. 1. In your internet browser, go to [email or web address] 2. Click on the First Time User? Click Here link in the Sign In box. You will see the New Member Sign Up page. 3. Enter your Fingerprint Access Code exactly as it appears below. You will not need to use this code after youve completed the sign-up process. If you do not sign up before the expiration date, you must request a new code. · Fingerprint Access Code: [email or web address] Expires: 9/19/2018  9:30 AM 
 
4. Enter the last four digits of your Social Security Number (xxxx) and Date of Birth (mm/dd/yyyy) as indicated and click Submit. You will be taken to the next sign-up page. 5. Create a Dream[employer]ed ID. This will be your [employer] login ID and cannot be changed, so think of one that is secure and easy to remember. 6. Create a [employer] password. You can change your password at any time. 7. Enter your Password Reset Question and Answer. This can be used at a later time if you forget your password. 8. Enter your e-mail address. You will receive e-mail notification when new information is available in [address] 9. Click Sign Up. You can now view and download portions of your medical record. 10. Click the Download Summary menu link to download a portable copy of your medical information. If you have questions, please visit the Frequently Asked Questions section of the [employer] website. Remember, [employer] is NOT to be used for urgent needs. For medical emergencies, dial 911. Now available from your iPhone and Android! Please provide this summary of care documentation to your next provider. Your primary care clinician is listed as [id number] [facility]. If you have any questions after today's visit, please call [phone].

## 2018-08-24 ENCOUNTER — TELEPHONE (OUTPATIENT)
Dept: FAMILY MEDICINE CLINIC | Age: 48
End: 2018-08-24

## 2018-08-24 NOTE — TELEPHONE ENCOUNTER
Requesting an order for medication to help her sleep and Anti depressant.  States these were spoken of during last OV

## 2018-08-27 ENCOUNTER — OFFICE VISIT (OUTPATIENT)
Dept: FAMILY MEDICINE CLINIC | Age: 48
End: 2018-08-27

## 2018-08-27 VITALS
DIASTOLIC BLOOD PRESSURE: 73 MMHG | RESPIRATION RATE: 20 BRPM | OXYGEN SATURATION: 96 % | HEIGHT: 67 IN | HEART RATE: 83 BPM | BODY MASS INDEX: 32.08 KG/M2 | SYSTOLIC BLOOD PRESSURE: 121 MMHG | WEIGHT: 204.4 LBS | TEMPERATURE: 98.1 F

## 2018-08-27 DIAGNOSIS — F32.0 CURRENT MILD EPISODE OF MAJOR DEPRESSIVE DISORDER WITHOUT PRIOR EPISODE (HCC): ICD-10-CM

## 2018-08-27 DIAGNOSIS — F41.9 ANXIETY: Primary | ICD-10-CM

## 2018-08-27 RX ORDER — ALPRAZOLAM 0.25 MG/1
TABLET ORAL
Qty: 60 TAB | Refills: 1 | Status: SHIPPED | OUTPATIENT
Start: 2018-08-27 | End: 2018-09-18

## 2018-08-27 RX ORDER — CITALOPRAM 20 MG/1
20 TABLET, FILM COATED ORAL DAILY
Qty: 30 TAB | Refills: 1 | Status: SHIPPED | OUTPATIENT
Start: 2018-08-27 | End: 2018-09-18

## 2018-08-27 NOTE — PROGRESS NOTES
Ernie Osorio is a 46 y.o.  female and presents with    Chief Complaint   Patient presents with    Anxiety           Subjective: Anxiety much worse in last few days. Hasn't taking zoloft for over 3 months. Difficulty sleeping. Heart palpitations. Additional Concerns:          Patient Active Problem List    Diagnosis Date Noted    Perimenopausal 2017    Hypothyroid 10/17/2011    Depression 10/17/2011    Genital herpes 10/17/2011     Current Outpatient Prescriptions   Medication Sig Dispense Refill    citalopram (CELEXA) 20 mg tablet Take 1 Tab by mouth daily. 30 Tab 1    ALPRAZolam (XANAX) 0.25 mg tablet Take 1 tablet up to twice a day if needed for anxiety and take 2 at bedtime 60 Tab 1    levothyroxine (SYNTHROID) 150 mcg tablet Take 1 Tab by mouth Daily (before breakfast). Brand name synthroid only  Indications: hypothyroidism 80 Tab 4     No Known Allergies  Past Medical History:   Diagnosis Date    Depression 10/17/2011    Hypothyroid 10/17/2011     Past Surgical History:   Procedure Laterality Date    HX  SECTION      times 2     Family History   Problem Relation Age of Onset    Heart Disease Mother     Hypertension Mother      Social History   Substance Use Topics    Smoking status: Never Smoker    Smokeless tobacco: Never Used    Alcohol use Yes       ROS       All other systems reviewed and are negative.       Objective:  Vitals:    18 0906   BP: 121/73   Pulse: 83   Resp: 20   Temp: 98.1 °F (36.7 °C)   TempSrc: Oral   SpO2: 96%   Weight: 204 lb 6.4 oz (92.7 kg)   Height: 5' 7\" (1.702 m)   PainSc:   0 - No pain   LMP: 2018                 alert, well appearing, and in no distress and oriented to person, place, and time  Chest - clear to auscultation, no wheezes, rales or rhonchi, symmetric air entry  Heart - normal rate, regular rhythm, normal S1, S2, no murmurs, rubs, clicks or gallops  Abdomen - soft, nontender, nondistended, no masses or organomegaly        LABS     TESTS      Assessment/Plan:    Anxiety much worse  Note school just started  Will try low dose xanax during the day and some at bedtime. Also try celexa    F/u 2 or 3 wk    Lab review: labs are reviewed, up to date and normal    Diagnoses and all orders for this visit:    1. Anxiety  -     citalopram (CELEXA) 20 mg tablet; Take 1 Tab by mouth daily.  -     ALPRAZolam (XANAX) 0.25 mg tablet; Take 1 tablet up to twice a day if needed for anxiety and take 2 at bedtime    2. Current mild episode of major depressive disorder without prior episode (HCC)  -     citalopram (CELEXA) 20 mg tablet; Take 1 Tab by mouth daily.  -     ALPRAZolam (XANAX) 0.25 mg tablet; Take 1 tablet up to twice a day if needed for anxiety and take 2 at bedtime          I have discussed the diagnosis with the patient and the intended plan as seen in the above orders. The patient has received an after-visit summary and questions were answered concerning future plans. I have discussed medication side effects and warnings with the patient as well. I have reviewed the plan of care with the patient, accepted their input and they are in agreement with the treatment goals. Follow-up Disposition:  Return in about 3 weeks (around 9/17/2018) for rov.

## 2018-08-27 NOTE — MR AVS SNAPSHOT
303 [facility] 
 
 
 [address] [place] [address] [place] [address] 
[phone] Patient: Javier Mckeon MRN: [id number] SGT:[id number] Visit Information Date & Time Provider Department Dept. Phone Encounter #  
 8/27/2018  8:30 AM Hollie Alvarado, 5501 [facility] [phone] [id number] Follow-up Instructions Return in about 3 weeks (around 9/17/2018) for rov. Follow-up and Disposition History Your Appointments 9/20/2018 10:00 AM  
[place] [address] [address]) Appt Note: Return in about 3 months (around 9/21/2018) for labs prior, EOV. [address] [place] [address] [facility] 83 222 [facility]  
  
   
 [address] [place] [address]  
  
    
 12/12/2018 10:00 AM  
Complete [place] [address] [address]) Appt Note: Return in about 4 months (around 12/15/2018) for physical, labs at next visit, EKG next visit. [address] [facility] [address] [facility] 83 222 [facility]  
  
   
 [address] [place] [address] Upcoming Health Maintenance Date Due  
 BREAST CANCER SCRN MAMMOGRAM 6/14/2018 FOBT Q 1 YEAR AGE 50-75 6/14/2018 Influenza Age 5 to Adult 8/1/2018 PAP AKA CERVICAL CYTOLOGY 10/13/2018 DTaP/Tdap/Td series (2 - Td) 10/17/2021 Allergies as of 8/27/2018  Review Complete On: 8/27/2018 By: Sarahi Crawford LPN No Known Allergies Current Immunizations  Reviewed on 10/17/2011 Name Date TDAP Vaccine 10/17/2011 Not reviewed this visit You Were Diagnosed With   
  
 Codes Comments Anxiety    -  Primary ICD-10-CM: F41.9 ICD-9-CM: 300.00 Current mild episode of major depressive disorder without prior episode [facility]     ICD-10-CM: F32.0 ICD-9-CM: 296.21 Vitals BP Pulse Temp Resp Height(growth percentile) Weight(growth percentile) 121/73 (BP 1 Location: Right arm, BP Patient Position: Sitting) 83 98.1 °F (36.7 °C) (Oral) 20 5' 7\" (1.702 m) 204 lb 6.4 oz (92.7 kg) LMP SpO2 BMI OB Status Smoking Status 08/07/2018 (Exact Date) 96% 32.01 kg/m2 Having regular periods Never Smoker BMI and BSA Data Body Mass Index Body Surface Area 32.01 kg/m 2 2.09 m 2 Preferred Pharmacy Pharmacy Name Phone [address], [address] [facility] [phone] Your Updated Medication List  
  
   
This list is accurate as of 8/27/18  9:37 AM.  Always use your most recent med list.  
  
  
  
  
 ALPRAZolam 0.25 mg tablet Commonly known as:  Charlett Orlando Take 1 tablet up to twice a day if needed for anxiety and take 2 at bedtime  
  
 citalopram 20 mg tablet Commonly known as:  Lien Bongo Take 1 Tab by mouth daily. levothyroxine 150 mcg tablet Commonly known as:  SYNTHROID Take 1 Tab by mouth Daily (before breakfast). Brand name synthroid only  Indications: hypothyroidism Prescriptions Printed Refills ALPRAZolam (XANAX) 0.25 mg tablet 1 Sig: Take 1 tablet up to twice a day if needed for anxiety and take 2 at bedtime Class: Print Prescriptions Sent to Pharmacy Refills  
 citalopram (CELEXA) 20 mg tablet 1 Sig: Take 1 Tab by mouth daily. Class: Normal  
 Pharmacy: [address], [address] Ph #: [phone] Route: Oral  
  
Follow-up Instructions Return in about 3 weeks (around 9/17/2018) for rov. Introducing [facility] & HEALTH SERVICES! [facility] introduces [employer] patient portal. Now you can access parts of your medical record, email your doctor's office, and request medication refills online. 1. In your internet browser, go to [email or web address] 2. Click on the First Time User? Click Here link in the Sign In box. You will see the New Member Sign Up page. 3. Enter your NUV[employer]A Access Code exactly as it appears below. You will not need to use this code after youve completed the sign-up process. If you do not sign up before the expiration date, you must request a new code. · N[employer] Access Code: [email or web address] Expires: 9/19/2018  9:30 AM 
 
4. Enter the last four digits of your Social Security Number (xxxx) and Date of Birth (mm/dd/yyyy) as indicated and click Submit. You will be taken to the next sign-up page. 5. Create a NUVETA ID. This will be your NUVETA login ID and cannot be changed, so think of one that is secure and easy to remember. 6. Create a NUVETA password. You can change your password at any time. 7. Enter your Password Reset Question and Answer. This can be used at a later time if you forget your password. 8. Enter your e-mail address. You will receive e-mail notification when new information is available in [address] 9. Click Sign Up. You can now view and download portions of your medical record. 10. Click the Download Summary menu link to download a portable copy of your medical information. If you have questions, please visit the Frequently Asked Questions section of the NUVETA website. Remember, NUVETA is NOT to be used for urgent needs. For medical emergencies, dial 911. Now available from your iPhone and Android! Please provide this summary of care documentation to your next provider. Your primary care clinician is listed as [address]. If you have any questions after today's visit, please call [phone].

## 2018-08-27 NOTE — PROGRESS NOTES
Room #      SUBJECTIVE:    Maria Alejandra Mora is a 46 y.o. female who presents today for anxiety attack    1. Have you been to the ER, urgent care clinic since your last visit? Hospitalized since your last visit? NO    2. Have you seen or consulted any other health care providers outside of the [address] [place] [address] since your last visit? Include any pap smears or colon screening. NO  When :  Reason:    Health Maintenance reviewed Yes patient declined Flu shot stated that she does not do flu shot    Health Maintenance Due   Topic Date Due    BREAST CANCER SCRN MAMMOGRAM  06/14/2018    FOBT Q 1 YEAR AGE 50-75  06/14/2018    Influenza Age 9 to Adult  08/01/2018    PAP AKA CERVICAL CYTOLOGY  10/13/2018

## 2018-08-30 ENCOUNTER — OFFICE VISIT (OUTPATIENT)
Dept: FAMILY MEDICINE CLINIC | Age: 48
End: 2018-08-30

## 2018-08-30 VITALS
OXYGEN SATURATION: 96 % | BODY MASS INDEX: 32.02 KG/M2 | SYSTOLIC BLOOD PRESSURE: 134 MMHG | WEIGHT: 204 LBS | HEIGHT: 67 IN | RESPIRATION RATE: 18 BRPM | HEART RATE: 94 BPM | DIASTOLIC BLOOD PRESSURE: 81 MMHG | TEMPERATURE: 97.2 F

## 2018-08-30 DIAGNOSIS — E03.9 ACQUIRED HYPOTHYROIDISM: Primary | ICD-10-CM

## 2018-08-30 DIAGNOSIS — F41.9 ANXIETY: ICD-10-CM

## 2018-08-30 RX ORDER — ALPRAZOLAM 1 MG/1
TABLET ORAL
Qty: 40 TAB | Refills: 1 | Status: SHIPPED | OUTPATIENT
Start: 2018-08-30 | End: 2018-09-06 | Stop reason: SDUPTHER

## 2018-08-30 NOTE — LETTER
NOTIFICATION RETURN TO WORK / SCHOOL 
 
8/30/2018 11:21 AM 
 
Ms. Daniel Murray [facility] [address] [address] To Whom It May Concern: 
 
Daniel Murray is currently under the care of [address] [place] [address]. Off work until cleared. If there are questions or concerns please have the patient contact our office.  
 
 
 
Sincerely, 
 
 
Aide Morrow., DO

## 2018-08-30 NOTE — MR AVS SNAPSHOT
303 [facility] 
 
 
 [address] [facility] [address] [place] [address] 
[phone] Patient: Remy Wells MRN: [id number] ALR:0/73/6498 Visit Information Date & Time Provider Department Dept. Phone Encounter #  
 8/30/2018 10:30 AM Lucy Locks., [address] [place] [address] [phone] [id number] Follow-up Instructions Return in about 1 week (around 9/6/2018) for EOV, labs today. Follow-up and Disposition History Your Appointments 9/18/2018  9:00 AM  
ROUTINE CARE with Lucy Locks., [place] [address] [place] [address]) Appt Note: Return in about 3 weeks (around 9/17/2018) for rov. [address] [place] [address] [address] [facility]  
  
   
 [address] [place] [address] [address]  
  
    
 9/20/2018 10:00 AM  
ROUTINE CARE with Lucy Locks., DO [address] [place] [address]) Appt Note: Return in about 3 months (around 9/21/2018) for labs prior, EOV. [address] [place] [address] [address] [place] [address]  
[phone]  
  
    
 12/12/2018 10:00 AM  
Complete Physical with Lucy Locks., DO [address] [place] [address]) Appt Note: Return in about 4 months (around 12/15/2018) for physical, labs at next visit, EKG next visit. [address] [place] [address] [address] [facility]  
  
   
 [id number] [place] [address] [address] [place] [address] [address] Upcoming Health Maintenance Date Due  
 BREAST CANCER SCRN MAMMOGRAM 6/14/2018 FOBT Q 1 YEAR AGE 50-75 6/14/2018 Influenza Age 5 to Adult 8/1/2018 PAP AKA CERVICAL CYTOLOGY 10/13/2018 DTaP/Tdap/Td series (2 - Td) 10/17/2021 Allergies as of 8/30/2018  Review Complete On: 8/27/2018 By: Quin Holstein, LPN No Known Allergies Current Immunizations  Reviewed on 10/17/2011 Name Date TDAP Vaccine 10/17/2011 Not reviewed this visit You Were Diagnosed With   
  
 Codes Comments Acquired hypothyroidism    -  Primary ICD-10-CM: E03.9 ICD-9-CM: 244.9 Anxiety     ICD-10-CM: F41.9 ICD-9-CM: 300.00 Vitals BP Pulse Temp Resp Height(growth percentile) Weight(growth percentile) 134/81 (BP 1 Location: Right arm, BP Patient Position: Sitting) 94 97.2 °F (36.2 °C) (Oral) 18 5' 7\" (1.702 m) 204 lb (92.5 kg) LMP SpO2 BMI OB Status Smoking Status 08/07/2018 (Exact Date) 96% 31.95 kg/m2 Having regular periods Never Smoker BMI and BSA Data Body Mass Index Body Surface Area 31.95 kg/m 2 2.09 m 2 Preferred Pharmacy Pharmacy Name Phone [address], 1 [facility] [phone] Your Updated Medication List  
  
   
This list is accurate as of 8/30/18 11:31 AM.  Always use your most recent med list.  
  
  
  
  
 * ALPRAZolam 0.25 mg tablet Commonly known as:  Charlett Fairchild Take 1 tablet up to twice a day if needed for anxiety and take 2 at bedtime * ALPRAZolam 1 mg tablet Commonly known as:  Charlett Fairchild Take 1 mg twice a day and 2 mg in evening. citalopram 20 mg tablet Commonly known as:  Lien Bongo Take 1 Tab by mouth daily. levothyroxine 150 mcg tablet Commonly known as:  SYNTHROID Take 1 Tab by mouth Daily (before breakfast). Brand name synthroid only  Indications: hypothyroidism * Notice: This list has 2 medication(s) that are the same as other medications prescribed for you. Read the directions carefully, and ask your doctor or other care provider to review them with you. Prescriptions Printed Refills ALPRAZolam (XANAX) 1 mg tablet 1 Sig: Take 1 mg twice a day and 2 mg in evening. Class: Print Follow-up Instructions Return in about 1 week (around 9/6/2018) for EOV, labs today. To-Do List   
 08/30/2018 Lab:  [facility] AND  Patient Instructions Take 1mg xanax in morning  ( can take 4 x 0.25mg) Take 1 mg xanax in afternoon  ( can take 4 x 0.25mg) Take 2 mg Xanax in evening before bed  ( can take 8 x 0.25mg ) Continue to take the celexa every day at bedtime Introducing hospitals & HEALTH SERVICES! [facility] introduces [employer] patient portal. Now you can access parts of your medical record, email your doctor's office, and request medication refills online. 1. In your internet browser, go to [email or web address] 2. Click on the First Time User? Click Here link in the Sign In box. You will see the New Member Sign Up page. 3. Enter your [employer] Access Code exactly as it appears below. You will not need to use this code after youve completed the sign-up process. If you do not sign up before the expiration date, you must request a new code. · [employer] Access Code: [email or web address] Expires: 9/19/2018  9:30 AM 
 
4. Enter the last four digits of your Social Security Number (xxxx) and Date of Birth (mm/dd/yyyy) as indicated and click Submit. You will be taken to the next sign-up page. 5. Create a [employer] ID. This will be your [employer] login ID and cannot be changed, so think of one that is secure and easy to remember. 6. Create a [employer] password. You can change your password at any time. 7. Enter your Password Reset Question and Answer. This can be used at a later time if you forget your password. 8. Enter your e-mail address. You will receive e-mail notification when new information is available in [address] 9. Click Sign Up. You can now view and download portions of your medical record. 10. Click the Download Summary menu link to download a portable copy of your medical information. If you have questions, please visit the Frequently Asked Questions section of the [employer] website. Remember, [employer] is NOT to be used for urgent needs. For medical emergencies, dial 911. Now available from your iPhone and Android! Please provide this summary of care documentation to your next provider. Your primary care clinician is listed as [id number] [facility]. If you have any questions after today's visit, please call [phone].

## 2018-08-30 NOTE — PROGRESS NOTES
Remy Wells is a 46 y.o. female presents today for increased anxiety and inability to sleep. Patient reports that when she started the increased dose of Xanax she feels more anxious with palpitations. Patient also reports that she was unable to sleep due to the increase of the symptoms. Pt is in Room # 4        1. Have you been to the ER, urgent care clinic since your last visit? Hospitalized since your last visit? No    2. Have you seen or consulted any other health care providers outside of the [facility] since your last visit? Include any pap smears or colon screening. No     Health Maintenance reviewed - .

## 2018-08-30 NOTE — PATIENT INSTRUCTIONS
Take 1mg xanax in morning  ( can take 4 x 0.25mg)    Take 1 mg xanax in afternoon  ( can take 4 x 0.25mg)    Take 2 mg Xanax in evening before bed  ( can take 8 x 0.25mg )    Continue to take the celexa every day at bedtime

## 2018-08-30 NOTE — PROGRESS NOTES
Tammy Cunningham is a 46 y.o.  female and presents with    Chief Complaint   Patient presents with    Anxiety    Palpitations           Subjective:  Severe worsening acute anxiety       Additional Concerns:          Patient Active Problem List    Diagnosis Date Noted    Perimenopausal 2017    Hypothyroid 10/17/2011    Depression 10/17/2011    Genital herpes 10/17/2011     Current Outpatient Prescriptions   Medication Sig Dispense Refill    ALPRAZolam (XANAX) 1 mg tablet Take 1 mg twice a day and 2 mg in evening. 40 Tab 1    citalopram (CELEXA) 20 mg tablet Take 1 Tab by mouth daily. 30 Tab 1    ALPRAZolam (XANAX) 0.25 mg tablet Take 1 tablet up to twice a day if needed for anxiety and take 2 at bedtime 60 Tab 1    levothyroxine (SYNTHROID) 150 mcg tablet Take 1 Tab by mouth Daily (before breakfast). Brand name synthroid only  Indications: hypothyroidism 80 Tab 4     No Known Allergies  Past Medical History:   Diagnosis Date    Depression 10/17/2011    Hypothyroid 10/17/2011     Past Surgical History:   Procedure Laterality Date    HX  SECTION      times 2     Family History   Problem Relation Age of Onset    Heart Disease Mother     Hypertension Mother      Social History   Substance Use Topics    Smoking status: Never Smoker    Smokeless tobacco: Never Used    Alcohol use Yes       ROS       All other systems reviewed and are negative.       Objective:  Vitals:    18 1048   BP: 134/81   Pulse: 94   Resp: 18   Temp: 97.2 °F (36.2 °C)   TempSrc: Oral   SpO2: 96%   Weight: 204 lb (92.5 kg)   Height: 5' 7\" (1.702 m)   PainSc:   0 - No pain   LMP: 2018                 alert, well appearing, and in no distress, oriented to person, place, and time and normal appearing weight  Chest - clear to auscultation, no wheezes, rales or rhonchi, symmetric air entry  Heart - normal rate, regular rhythm, normal S1, S2, no murmurs, rubs, clicks or gallops  Abdomen - soft, nontender, nondistended, no masses or organomegaly        LABS     TESTS      Assessment/Plan:    Acute anxiety  Will try upping xanax to 1mg during the day and 2mg in evening. Give the celexa a week to work  If no help then consider back to zoloft  Also check thyroid and menopausal labs today   Off work until cleared      Lab review: labs are reviewed, up to date and normal    Diagnoses and all orders for this visit:    1. Acquired hypothyroidism  -     ALPRAZolam (XANAX) 1 mg tablet; Take 1 mg twice a day and 2 mg in evening. 2. Anxiety  -     ALPRAZolam (XANAX) 1 mg tablet; Take 1 mg twice a day and 2 mg in evening. I have discussed the diagnosis with the patient and the intended plan as seen in the above orders. The patient has received an after-visit summary and questions were answered concerning future plans. I have discussed medication side effects and warnings with the patient as well. I have reviewed the plan of care with the patient, accepted their input and they are in agreement with the treatment goals. Follow-up Disposition:  Return in about 1 week (around 9/6/2018) for EOV, labs today.

## 2018-08-31 LAB
FSH SERPL-ACNC: 35.6 MIU/ML
LH SERPL-ACNC: 27.7 MIU/ML

## 2018-09-06 ENCOUNTER — OFFICE VISIT (OUTPATIENT)
Dept: FAMILY MEDICINE CLINIC | Age: 48
End: 2018-09-06

## 2018-09-06 VITALS
TEMPERATURE: 99.1 F | HEART RATE: 89 BPM | DIASTOLIC BLOOD PRESSURE: 72 MMHG | WEIGHT: 197.4 LBS | SYSTOLIC BLOOD PRESSURE: 123 MMHG | OXYGEN SATURATION: 94 % | RESPIRATION RATE: 20 BRPM | BODY MASS INDEX: 30.98 KG/M2 | HEIGHT: 67 IN

## 2018-09-06 DIAGNOSIS — F41.9 ANXIETY: ICD-10-CM

## 2018-09-06 DIAGNOSIS — E03.9 ACQUIRED HYPOTHYROIDISM: ICD-10-CM

## 2018-09-06 RX ORDER — ALPRAZOLAM 1 MG/1
TABLET ORAL
Qty: 40 TAB | Refills: 1 | Status: SHIPPED | OUTPATIENT
Start: 2018-09-06 | End: 2018-09-18 | Stop reason: SDUPTHER

## 2018-09-06 NOTE — MR AVS SNAPSHOT
303 [facility] [address] [place] [address] 
[phone] Patient: Radha Hopson MRN: [id number] US Visit Information Date & Time Provider Department Dept. Phone Encounter #  
 2018  8:00 AM Torrie Roof., 5501 [facility] [phone] [id number] Follow-up Instructions Return in about 10 days (around 2018) for already scheduled. Follow-up and Disposition History Your Appointments 2018  9:00 AM  
ROUTINE CARE with Torrie Roof., DO [address] [place] [address]) Appt Note: Return in about 3 weeks (around 2018) for rov. [address] 83 222 [facility] 400 [facility]  
  
    
 2018 10:00 AM  
ROUTINE CARE with Torrie Roof., DO [address] [place] [address]) Appt Note: Return in about 3 months (around 2018) for labs prior, EOV. [place] [address]  
[phone]  
  
    
 2018 10:00 AM  
Complete Physical with Torrie Roof., DO [address] [place] [address]) Appt Note: Return in about 4 months (around 12/15/2018) for physical, labs at next visit, EKG next visit. [facility] [address] [place] [address] 83 222 [facility] [address] [place] [address] [address] Upcoming Health Maintenance Date Due  
 BREAST CANCER SCRN MAMMOGRAM 2018 FOBT Q 1 YEAR AGE 50-75 2018 Influenza Age 5 to Adult 2018 PAP AKA CERVICAL CYTOLOGY 10/13/2018 DTaP/Tdap/Td series (2 - Td) 10/17/2021 Allergies as of 2018  Review Complete On: 2018 By: Bigg Tran LPN No Known Allergies Current Immunizations  Reviewed on 10/17/2011 Name Date TDAP Vaccine 10/17/2011 Not reviewed this visit You Were Diagnosed With   
  
 Codes Comments Acquired hypothyroidism     ICD-10-CM: E03.9 ICD-9-CM: 244.9 Anxiety     ICD-10-CM: F41.9 ICD-9-CM: 300.00 Vitals BP Pulse Temp Resp Height(growth percentile) Weight(growth percentile) 123/72 (BP 1 Location: Right arm, BP Patient Position: Sitting) 89 99.1 °F (37.3 °C) (Oral) 20 5' 7\" (1.702 m) 197 lb 6.4 oz (89.5 kg) LMP SpO2 BMI OB Status Smoking Status 08/07/2018 (Exact Date) 94% 30.92 kg/m2 Having regular periods Never Smoker BMI and BSA Data Body Mass Index Body Surface Area 30.92 kg/m 2 2.06 m 2 Preferred Pharmacy Pharmacy Name Phone [address], [address] [place] [phone] Your Updated Medication List  
  
   
This list is accurate as of 9/6/18  8:36 AM.  Always use your most recent med list.  
  
  
  
  
 * ALPRAZolam 0.25 mg tablet Commonly known as:  Charlett Hull Take 1 tablet up to twice a day if needed for anxiety and take 2 at bedtime * ALPRAZolam 1 mg tablet Commonly known as:  Charlett Hull Take 1 mg twice a day and 2 mg in evening. citalopram 20 mg tablet Commonly known as:  Lien Bongo Take 1 Tab by mouth daily. levothyroxine 150 mcg tablet Commonly known as:  SYNTHROID Take 1 Tab by mouth Daily (before breakfast). Brand name synthroid only  Indications: hypothyroidism * Notice: This list has 2 medication(s) that are the same as other medications prescribed for you. Read the directions carefully, and ask your doctor or other care provider to review them with you. Prescriptions Printed Refills ALPRAZolam (XANAX) 1 mg tablet 1 Sig: Take 1 mg twice a day and 2 mg in evening. Class: Print Follow-up Instructions Return in about 10 days (around 9/16/2018) for already scheduled. Introducing [facility] & HEALTH SERVICES!    
 [facility] introduces [employer] patient portal. Now you can access parts of your medical record, email your doctor's office, and request medication refills online. 1. In your internet browser, go to [email or web address] 2. Click on the First Time User? Click Here link in the Sign In box. You will see the New Member Sign Up page. 3. Enter your [employer] Access Code exactly as it appears below. You will not need to use this code after youve completed the sign-up process. If you do not sign up before the expiration date, you must request a new code. · [employer] Access Code: [email or web address] Expires: 9/19/2018  9:30 AM 
 
4. Enter the last four digits of your Social Security Number (xxxx) and Date of Birth (mm/dd/yyyy) as indicated and click Submit. You will be taken to the next sign-up page. 5. Create a [employer] ID. This will be your [employer] login ID and cannot be changed, so think of one that is secure and easy to remember. 6. Create a [employer] password. You can change your password at any time. 7. Enter your Password Reset Question and Answer. This can be used at a later time if you forget your password. 8. Enter your e-mail address. You will receive e-mail notification when new information is available in [address] 9. Click Sign Up. You can now view and download portions of your medical record. 10. Click the Download Summary menu link to download a portable copy of your medical information. If you have questions, please visit the Frequently Asked Questions section of the [employer] website. Remember, [employer] is NOT to be used for urgent needs. For medical emergencies, dial 911. Now available from your iPhone and Android! Please provide this summary of care documentation to your next provider. Your primary care clinician is listed as [address]. If you have any questions after today's visit, please call [phone].

## 2018-09-06 NOTE — LETTER
NOTIFICATION RETURN TO WORK / SCHOOL 
 
9/6/2018 8:32 AM 
 
Ms. Tyra Rod [address] [address] To Whom It May Concern: 
 
Tyra Rod is currently under the care of [address] [place] [address]. Off work until cleared. Will see back on 9/18 and possibly clear back to work. If there are questions or concerns please have the patient contact our office.  
 
 
 
Sincerely, 
 
 
Ivelisse Hurley., DO

## 2018-09-06 NOTE — PROGRESS NOTES
Maria Alejandra Mora is a 46 y.o.  female and presents with    Chief Complaint   Patient presents with    Anxiety           Subjective:  Here for f/u. Sleeping much better. Much less nervous        Additional Concerns:          Patient Active Problem List    Diagnosis Date Noted    Perimenopausal 2017    Hypothyroid 10/17/2011    Depression 10/17/2011    Genital herpes 10/17/2011     Current Outpatient Prescriptions   Medication Sig Dispense Refill    ALPRAZolam (XANAX) 1 mg tablet Take 1 mg twice a day and 2 mg in evening. 40 Tab 1    citalopram (CELEXA) 20 mg tablet Take 1 Tab by mouth daily. 30 Tab 1    ALPRAZolam (XANAX) 0.25 mg tablet Take 1 tablet up to twice a day if needed for anxiety and take 2 at bedtime 60 Tab 1    levothyroxine (SYNTHROID) 150 mcg tablet Take 1 Tab by mouth Daily (before breakfast). Brand name synthroid only  Indications: hypothyroidism [address] Tab 4     No Known Allergies  Past Medical History:   Diagnosis Date    Depression 10/17/2011    Hypothyroid 10/17/2011     Past Surgical History:   Procedure Laterality Date    HX  SECTION      times 2     Family History   Problem Relation Age of Onset    Heart Disease Mother     Hypertension Mother      Social History   Substance Use Topics    Smoking status: Never Smoker    Smokeless tobacco: Never Used    Alcohol use Yes       ROS       All other systems reviewed and are negative.       Objective:  Vitals:    18 0803   BP: 123/72   Pulse: 89   Resp: 20   Temp: 99.1 °F (37.3 °C)   TempSrc: Oral   SpO2: 94%   Weight: 197 lb 6.4 oz (89.5 kg)   Height: 5' 7\" (1.702 m)   PainSc:   0 - No pain   LMP: 2018                 alert, well appearing, and in no distress and oriented to person, place, and time  Chest - clear to auscultation, no wheezes, rales or rhonchi, symmetric air entry  Heart - normal rate, regular rhythm, normal S1, S2, no murmurs, rubs, clicks or gallops        LABS   lh and fsh menopausal  TESTS      Assessment/Plan:    Acute perimenopausal with complication of zoloft withdrawl. Anxiety/panic imporved on xanax and celexa  Will continue current course and f/u 12 days -- keep off work until then      Lab review:     Diagnoses and all orders for this visit:    1. Acquired hypothyroidism  -     ALPRAZolam (XANAX) 1 mg tablet; Take 1 mg twice a day and 2 mg in evening. 2. Anxiety  -     ALPRAZolam (XANAX) 1 mg tablet; Take 1 mg twice a day and 2 mg in evening. I have discussed the diagnosis with the patient and the intended plan as seen in the above orders. The patient has received an after-visit summary and questions were answered concerning future plans. I have discussed medication side effects and warnings with the patient as well. I have reviewed the plan of care with the patient, accepted their input and they are in agreement with the treatment goals. Follow-up Disposition:  Return in about 10 days (around 9/16/2018) for already scheduled.

## 2018-09-17 ENCOUNTER — TELEPHONE (OUTPATIENT)
Dept: FAMILY MEDICINE CLINIC | Age: 48
End: 2018-09-17

## 2018-09-17 NOTE — TELEPHONE ENCOUNTER
Spoke with Daniel Murray, Verified 2 patient identifiers. Spoke with patient in regards to her inability to sleep as she reports the xanax is not working. Patient reports that she has not had any sleep for 4 days now. Patient wanting to know if she could take anything over the counter until she is seen tomorrow. Patient was advised not to take anything OTC has it could interfere with the current medications that she is taking. patient also inquired if she could take melatonin. Patient advised that melatonin is a sleep herbal but again may or maynot help with the current controlled medications that she is currently taking.  Patient acknowledges understanding and voices no further questions or concerns at this time

## 2018-09-17 NOTE — TELEPHONE ENCOUNTER
Patient called in to request change in mediation. States Xanax does not seem to be working and is requesting a call back from nurse.     Patient has been reminded of her appt Tuesday, September 18, 2018 09:00 AM

## 2018-09-18 ENCOUNTER — OFFICE VISIT (OUTPATIENT)
Dept: FAMILY MEDICINE CLINIC | Age: 48
End: 2018-09-18

## 2018-09-18 VITALS
OXYGEN SATURATION: 94 % | WEIGHT: 192.6 LBS | HEART RATE: 74 BPM | HEIGHT: 67 IN | TEMPERATURE: 96.6 F | SYSTOLIC BLOOD PRESSURE: 103 MMHG | BODY MASS INDEX: 30.23 KG/M2 | DIASTOLIC BLOOD PRESSURE: 76 MMHG | RESPIRATION RATE: 17 BRPM

## 2018-09-18 DIAGNOSIS — F32.0 CURRENT MILD EPISODE OF MAJOR DEPRESSIVE DISORDER WITHOUT PRIOR EPISODE (HCC): Primary | ICD-10-CM

## 2018-09-18 DIAGNOSIS — E03.9 ACQUIRED HYPOTHYROIDISM: ICD-10-CM

## 2018-09-18 DIAGNOSIS — F41.9 ANXIETY: ICD-10-CM

## 2018-09-18 RX ORDER — CITALOPRAM 40 MG/1
40 TABLET, FILM COATED ORAL
Qty: 30 TAB | Refills: 1 | Status: SHIPPED | OUTPATIENT
Start: 2018-09-18 | End: 2018-09-26

## 2018-09-18 RX ORDER — ESTRADIOL AND NORETHINDRONE ACETATE 1; .5 MG/1; MG/1
1 TABLET ORAL
Qty: 30 TAB | Refills: 1 | Status: SHIPPED | OUTPATIENT
Start: 2018-09-18 | End: 2018-10-24 | Stop reason: SDUPTHER

## 2018-09-18 RX ORDER — ALPRAZOLAM 1 MG/1
TABLET ORAL
Qty: 40 TAB | Refills: 1 | Status: SHIPPED | OUTPATIENT
Start: 2018-09-18 | End: 2018-10-10 | Stop reason: SDUPTHER

## 2018-09-18 NOTE — PROGRESS NOTES
Tammy Cunningham is a 46 y.o.  female and presents with Chief Complaint Patient presents with  Menopause  Depression  Agitation Subjective: 
Still very depressed. Less weepy. Has lost 20 pounds in last 3 months. Not sleeping very well. Has added melatonin to xanax at hs 
Having hot flashes. Additional Concerns:   
 
 
 
Patient Active Problem List  
 Diagnosis Date Noted  Perimenopausal 2017  Hypothyroid 10/17/2011  Depression 10/17/2011  Genital herpes 10/17/2011 Current Outpatient Prescriptions Medication Sig Dispense Refill  citalopram (CELEXA) 40 mg tablet Take 1 Tab by mouth every morning. 30 Tab 1  
 estradiol-norethindrone (ACTIVELLA) 1-0.5 mg per tablet Take 1 Tab by mouth every morning. Indications: VASOMOTOR SYMPTOMS ASSOCIATED WITH MENOPAUSE 30 Tab 1  ALPRAZolam (XANAX) 1 mg tablet Take 1 mg twice a day and 2 mg in evening. 40 Tab 1  
 levothyroxine (SYNTHROID) 150 mcg tablet Take 1 Tab by mouth Daily (before breakfast). Brand name synthroid only  Indications: hypothyroidism 90 Tab 4 No Known Allergies Past Medical History:  
Diagnosis Date  Depression 10/17/2011  Hypothyroid 10/17/2011 Past Surgical History:  
Procedure Laterality Date  HX  SECTION    
 times 2 Family History Problem Relation Age of Onset  Heart Disease Mother  Hypertension Mother Social History Substance Use Topics  Smoking status: Never Smoker  Smokeless tobacco: Never Used  Alcohol use Yes ROS All other systems reviewed and are negative. Objective: 
Vitals:  
 18 4046 BP: 103/76 Pulse: 74 Resp: 17 Temp: 96.6 °F (35.9 °C) TempSrc: Oral  
SpO2: 94% Weight: 192 lb 9.6 oz (87.4 kg) Height: 5' 7\" (1.702 m) PainSc:   0 - No pain  
 
 
 
 
 
 
 
alert, well appearing, and in no distress, oriented to person, place, and time and normal appearing weight Mental status - anxious Chest - clear to auscultation, no wheezes, rales or rhonchi, symmetric air entry Heart - normal rate, regular rhythm, normal S1, S2, no murmurs, rubs, clicks or gallops LABS TESTS Assessment/Plan:   
Menopausal symptoms will try some activella for next month or so to see if it helps Depression anxiety insomnia much worse since stopping zoloft on the other hand weight is much better. Will try incrasing celexa for amonth. If no response then consider back to zoloft or [id number] else. Continue xanax for anxiety and insomnia -- may try and decrease if doing better. Lab review:  
 
Diagnoses and all orders for this visit: 1. Current mild episode of major depressive disorder without [facility] 2. Acquired hypothyroidism -     ALPRAZolam (XANAX) 1 mg tablet; Take 1 mg twice a day and 2 mg in evening. 3. Anxiety -     ALPRAZolam (XANAX) 1 mg tablet; Take 1 mg twice a day and 2 mg in evening. Other orders 
-     citalopram (CELEXA) 40 mg tablet; Take 1 Tab by mouth every morning. 
-     estradiol-norethindrone (ACTIVELLA) 1-0.5 mg per tablet; Take 1 Tab by mouth every morning. Indications: VASOMOTOR SYMPTOMS ASSOCIATED WITH MENOPAUSE I have discussed the diagnosis with the patient and the intended plan as seen in the above orders. The patient has received an after-visit summary and questions were answered concerning future plans. I have discussed medication side effects and warnings with the patient as well. I have reviewed the plan of care with the patient, accepted their input and they are in agreement with the treatment goals. Follow-up Disposition: 
Return in about 4 weeks (around 10/16/2018) for rov, cancel upcoming appt.

## 2018-09-18 NOTE — MR AVS SNAPSHOT
[address] [facility] [id number] 
[phone] Patient: Devang Cotter MRN: [id number] HI Visit Information Date & Time Provider Department Dept. Phone Encounter #  
 2018  9:00 AM Janice Ferrer [phone] [id number] Follow-up Instructions Return in about 4 weeks (around 10/16/2018) for rov, cancel upcoming appt. Follow-up and Disposition History Your Appointments 2018 10:00 AM  
ROUTINE CARE with Rosemary Hill,  [address] [address]) Appt Note: Return in about 3 months (around 2018) for labs prior, EOV. [address] [place] [address] [address] [facility] [address]  
  
    
 2018 10:00 AM  
Complete Physical with Rosemary Hill DO [address] [place] [address]) Appt Note: Return in about 4 months (around 12/15/2018) for physical, labs at next visit, EKG next visit. [address] [place] [address] [address] [place] [address] [address] [place] [address] [address] Upcoming Health Maintenance Date Due  
 BREAST CANCER SCRN MAMMOGRAM 2018 FOBT Q 1 YEAR AGE 50-75 2018 Influenza Age 5 to Adult 2018 PAP AKA CERVICAL CYTOLOGY 10/13/2018 DTaP/Tdap/Td series (2 - Td) 10/17/2021 Allergies as of 2018  Review Complete On: 2018 By: Rosemary Hill DO No Known Allergies Current Immunizations  Reviewed on 10/17/2011 Name Date TDAP Vaccine 10/17/2011 Not reviewed this visit You Were Diagnosed With   
  
 Codes Comments Current mild episode of major depressive disorder without prior episode ([facility]    -  Primary ICD-10-CM: F32.0 ICD-9-CM: 296.21 Acquired hypothyroidism     ICD-10-CM: E03.9 ICD-9-CM: 244.9 Anxiety     ICD-10-CM: F41.9 ICD-9-CM: 300.00 Vitals BP Pulse Temp Resp Height(growth percentile) Weight(growth percentile) 103/76 74 96.6 °F (35.9 °C) (Oral) 17 5' 7\" (1.702 m) 192 lb 9.6 oz (87.4 kg) SpO2 BMI OB Status Smoking Status 94% 30.17 kg/m2 Premenopausal Never Smoker Vitals History BMI and BSA Data Body Mass Index Body Surface Area  
 30.17 kg/m 2 2.03 m 2 Preferred Pharmacy Pharmacy Name Phone [address] [facility] [phone] Your Updated Medication List  
  
   
This list is accurate as of 9/18/18  9:19 AM.  Always use your most recent med list.  
  
  
  
  
 ALPRAZolam 1 mg tablet Commonly known as:  Carletha Puller Take 1 mg twice a day and 2 mg in evening. citalopram 40 mg tablet Commonly known as:  Lajuanda Gearing Take 1 Tab by mouth every morning. estradiol-norethindrone 1-0.5 mg per tablet Commonly known as:  Lauri Arrow Take 1 Tab by mouth every morning. Indications: VASOMOTOR SYMPTOMS ASSOCIATED WITH MENOPAUSE  
  
 levothyroxine 150 mcg tablet Commonly known as:  SYNTHROID Take 1 Tab by mouth Daily (before breakfast). Brand name synthroid only  Indications: hypothyroidism Prescriptions Printed Refills  
 citalopram (CELEXA) 40 mg tablet 1 Sig: Take 1 Tab by mouth every morning. Class: Print Route: Oral  
 estradiol-norethindrone (ACTIVELLA) 1-0.5 mg per tablet 1 Sig: Take 1 Tab by mouth every morning. Indications: VASOMOTOR SYMPTOMS ASSOCIATED WITH MENOPAUSE Class: Print Route: Oral  
 ALPRAZolam (XANAX) 1 mg tablet 1 Sig: Take 1 mg twice a day and 2 mg in evening. Class: Print Follow-up Instructions Return in about 4 weeks (around 10/16/2018) for rov, cancel upcoming appt. Introducing [facility] & HEALTH SERVICES!    
 [facility] introduces [email or web address] patient portal. Now you can access parts of your medical record, email your doctor's office, and request medication refills online. 1. In your internet browser, go to [email or web address] 2. Click on the First Time User? Click Here link in the Sign In box. You will see the New Member Sign Up page. 3. Enter your Cardpool Access Code exactly as it appears below. You will not need to use this code after youve completed the sign-up process. If you do not sign up before the expiration date, you must request a new code. · Cardpool Access Code: [email or web address] Expires: 9/19/2018  9:30 AM 
 
4. Enter the last four digits of your Social Security Number (xxxx) and Date of Birth (mm/dd/yyyy) as indicated and click Submit. You will be taken to the next sign-up page. 5. Create a Cardpool ID. This will be your Cardpool login ID and cannot be changed, so think of one that is secure and easy to remember. 6. Create a Cardpool password. You can change your password at any time. 7. Enter your Password Reset Question and Answer. This can be used at a later time if you forget your password. 8. Enter your e-mail address. You will receive e-mail notification when new information is available in [address] 9. Click Sign Up. You can now view and download portions of your medical record. 10. Click the Download Summary menu link to download a portable copy of your medical information. If you have questions, please visit the Frequently Asked Questions section of the Cardpool website. Remember, Cardpool is NOT to be used for urgent needs. For medical emergencies, dial 911. Now available from your iPhone and Android! Please provide this summary of care documentation to your next provider. Your primary care clinician is listed as [id number] [facility] [address]. If you have any questions after today's visit, please call [phone].

## 2018-09-18 NOTE — LETTER
NOTIFICATION RETURN TO WORK / SCHOOL 
 
9/18/2018 9:13 AM 
 
Ms. Diana Sainz [address] [address] To Whom It May Concern: 
 
Diana Sainz is currently under the care of [address] [place] [address]. She  Is off work until cleared. If there are questions or concerns please have the patient contact our office.  
 
 
 
Sincerely, 
 
 
 
 
Lyric Calvert., DO

## 2018-09-18 NOTE — PROGRESS NOTES
Tammy Cunningham is a 46 y.o. female presents in office for anety follow up. Patient reports she has been having a hard time sleeping. Patient denies pain at this time. Abuse and Depression screening completed. Abuse Screening Questionnaire 9/18/2018 Do you ever feel afraid of your partner? Roseline Host Are you in a relationship with someone who physically or mentally threatens you? Roseline Host Is it safe for you to go home? Y  
 
PHQ over the last two weeks 9/18/2018 PHQ Not Done - Little interest or pleasure in doing things Nearly every day Feeling down, depressed, irritable, or hopeless Several days Total Score PHQ 2 4 Trouble falling or staying asleep, or sleeping too much Nearly every day Feeling tired or having little energy Nearly every day Poor appetite, weight loss, or overeating Several days Feeling bad about yourself - or that you are a failure or have let yourself or your family down Not at all Trouble concentrating on things such as school, work, reading, or watching TV Several days Moving or speaking so slowly that other people could have noticed; or the opposite being so fidgety that others notice Several days Thoughts of being better off dead, or hurting yourself in some way Not at all PHQ 9 Score 13 How difficult have these problems made it for you to do your work, take care of your home and get along with others Extremely difficult Health Maintenance Due Topic Date Due  
 BREAST CANCER SCRN MAMMOGRAM  06/14/2018  FOBT Q 1 YEAR AGE 50-75  06/14/2018  Influenza Age 5 to Adult  08/01/2018  PAP AKA CERVICAL CYTOLOGY  10/13/2018 1. Have you been to the ER, urgent care clinic since your last visit? Hospitalized since your last visit? No 
 
2. Have you seen or consulted any other health care providers outside of the [address] [place] [address] since your last visit? Include any pap smears or colon screening.  No

## 2018-09-19 DIAGNOSIS — E03.9 ACQUIRED HYPOTHYROIDISM: ICD-10-CM

## 2018-09-26 ENCOUNTER — OFFICE VISIT (OUTPATIENT)
Dept: FAMILY MEDICINE CLINIC | Age: 48
End: 2018-09-26

## 2018-09-26 VITALS
TEMPERATURE: 98 F | HEIGHT: 67 IN | OXYGEN SATURATION: 98 % | BODY MASS INDEX: 29.66 KG/M2 | SYSTOLIC BLOOD PRESSURE: 123 MMHG | DIASTOLIC BLOOD PRESSURE: 70 MMHG | HEART RATE: 91 BPM | RESPIRATION RATE: 20 BRPM | WEIGHT: 189 LBS

## 2018-09-26 DIAGNOSIS — F32.0 CURRENT MILD EPISODE OF MAJOR DEPRESSIVE DISORDER WITHOUT PRIOR EPISODE (HCC): Primary | ICD-10-CM

## 2018-09-26 DIAGNOSIS — E03.9 ACQUIRED HYPOTHYROIDISM: ICD-10-CM

## 2018-09-26 RX ORDER — SERTRALINE HYDROCHLORIDE 100 MG/1
100 TABLET, FILM COATED ORAL
Qty: 90 TAB | Refills: 1 | Status: SHIPPED | OUTPATIENT
Start: 2018-09-26 | End: 2018-10-10 | Stop reason: SDUPTHER

## 2018-09-26 NOTE — PROGRESS NOTES
Onesimo Zamora is a 46 y.o.  female and presents with    Chief Complaint   Patient presents with    Depression    Menopause           Subjective:  Still can't sleep. Very depressed. Very anxious   Still having hot flashes        Additional Concerns:          Patient Active Problem List    Diagnosis Date Noted    Perimenopausal 2017    Hypothyroid 10/17/2011    Depression 10/17/2011    Genital herpes 10/17/2011     Current Outpatient Prescriptions   Medication Sig Dispense Refill    sertraline (ZOLOFT) 100 mg tablet Take 1 Tab by mouth nightly. 90 Tab 1    estradiol-norethindrone (ACTIVELLA) 1-0.5 mg per tablet Take 1 Tab by mouth every morning. Indications: VASOMOTOR SYMPTOMS ASSOCIATED WITH MENOPAUSE 30 Tab 1    ALPRAZolam (XANAX) 1 mg tablet Take 1 mg twice a day and 2 mg in evening. 40 Tab 1    levothyroxine (SYNTHROID) 150 mcg tablet Take 1 Tab by mouth Daily (before breakfast). Brand name synthroid only  Indications: hypothyroidism 80 Tab 4     No Known Allergies  Past Medical History:   Diagnosis Date    Depression 10/17/2011    Hypothyroid 10/17/2011     Past Surgical History:   Procedure Laterality Date    HX  SECTION      times 2     Family History   Problem Relation Age of Onset    Heart Disease Mother     Hypertension Mother      Social History   Substance Use Topics    Smoking status: Never Smoker    Smokeless tobacco: Never Used    Alcohol use Yes       ROS       All other systems reviewed and are negative.       Objective:  Vitals:    18 1407   BP: 123/70   Pulse: 91   Resp: 20   Temp: 98 °F (36.7 °C)   TempSrc: Oral   SpO2: 98%   Weight: 189 lb (85.7 kg)   Height: 5' 7\" (1.702 m)   PainSc:   0 - No pain                 alert, well appearing, and in no distress and oriented to person, place, and time  Chest - clear to auscultation, no wheezes, rales or rhonchi, symmetric air entry  Heart - normal rate, regular rhythm, normal S1, S2, no murmurs, rubs, clicks or gallops  Abdomen - soft, nontender, nondistended, no masses or organomegaly        LABS     TESTS      Assessment/Plan:    Anxiety/depression  Switch back to zoloft and f/u 2 wk  Menopause on activella      Lab review: labs are reviewed, up to date and normal    Diagnoses and all orders for this visit:    1. Current mild episode of major depressive disorder without prior episode (HCC)  -     sertraline (ZOLOFT) 100 mg tablet; Take 1 Tab by mouth nightly. 2. Acquired hypothyroidism          I have discussed the diagnosis with the patient and the intended plan as seen in the above orders. The patient has received an after-visit summary and questions were answered concerning future plans. I have discussed medication side effects and warnings with the patient as well. I have reviewed the plan of care with the patient, accepted their input and they are in agreement with the treatment goals. Follow-up Disposition:  Return in about 2 weeks (around 10/10/2018) for EOV.

## 2018-09-26 NOTE — MR AVS SNAPSHOT
Sofie Manjarrez 
 
 
 [address] [address] [place] [address] [facility] [address] [address] 
[phone] Patient: Edwin Watkins MRN: [id number] BY:[birth year] Visit Information Date & Time Provider Department Dept. Phone Encounter #  
 9/26/2018  2:30  [place] [id number] [phone] Follow-up Instructions Return in about 2 weeks (around 10/10/2018) for EOV. Follow-up and Disposition History Your Appointments 10/16/2018  9:00 AM  
[place] [address] [address]) Appt Note: Return in about 4 weeks (around 10/16/2018) for rov, cancel upcoming appt. [address] [facility] [address]  
  
   
 [address] [place] [address]  
  
    
 12/12/2018 10:00 AM  
[place] [address] [address]) Appt Note: Return in about 4 months (around 12/15/2018) for physical, labs at next visit, EKG next visit. [address] [facility]  
  
   
 [address] [place] [address] Upcoming Health Maintenance Date Due Shingrix Vaccine Age 50> (1 of 2) 6/14/2018 BREAST CANCER SCRN MAMMOGRAM 6/14/2018 FOBT Q 1 YEAR AGE 50-75 6/14/2018 Influenza Age 5 to Adult 8/1/2018 PAP AKA CERVICAL CYTOLOGY 10/13/2018 DTaP/Tdap/Td series (2 - Td) 10/17/2021 Allergies as of 9/26/2018  Review Complete On: 9/26/2018 By: Areli Winn, DO No Known Allergies Current Immunizations  Reviewed on 10/17/2011 Name Date TDAP Vaccine 10/17/2011 Not reviewed this visit You Were Diagnosed With   
  
 Codes Comments Current mild episode of major depressive disorder without prior episode ([facility]    -  Primary ICD-10-CM: F32.0 ICD-9-CM: 296.21 Acquired hypothyroidism     ICD-10-CM: E03.9 ICD-9-CM: 244.9 Vitals BP Pulse Temp Resp Height(growth percentile) Weight(growth percentile) 123/70 (BP 1 Location: Right arm, BP Patient Position: Sitting) 91 98 °F (36.7 °C) (Oral) 20 5' 7\" (1.702 m) 189 lb (85.7 kg) SpO2 BMI OB Status Smoking Status 98% 29.6 kg/m2 Premenopausal Never Smoker BMI and BSA Data Body Mass Index Body Surface Area  
 29.6 kg/m 2 2.01 m 2 Preferred Pharmacy Pharmacy Name Phone [address] Patel [phone] Your Updated Medication List  
  
   
This list is accurate as of 9/26/18  2:30 PM.  Always use your most recent med list.  
  
  
  
  
 ALPRAZolam 1 mg tablet Commonly known as:  Travis Cape Take 1 mg twice a day and 2 mg in evening. estradiol-norethindrone 1-0.5 mg per tablet Commonly known as:  Taylor Jamir Take 1 Tab by mouth every morning. Indications: VASOMOTOR SYMPTOMS ASSOCIATED WITH MENOPAUSE  
  
 levothyroxine 150 mcg tablet Commonly known as:  SYNTHROID Take 1 Tab by mouth Daily (before breakfast). Brand name synthroid only  Indications: hypothyroidism  
  
 sertraline 100 mg tablet Commonly known as:  ZOLOFT Take 1 Tab by mouth nightly. Prescriptions Sent to Pharmacy Refills  
 sertraline (ZOLOFT) 100 mg tablet 1 Sig: Take 1 Tab by mouth nightly. Class: Normal  
 Pharmacy: [address]  #: [phone] Route: Oral  
  
Follow-up Instructions Return in about 2 weeks (around 10/10/2018) for EOV. Introducing [facility] & HEALTH SERVICES! Jovan Houser introduces [employer] patient portal. Now you can access parts of your medical record, email your doctor's office, and request medication refills online. 1. In your internet browser, go to [email or web address] 2. Click on the First Time User? Click Here link in the Sign In box. You will see the New Member Sign Up page. 3. Enter your MobilePro Access Code exactly as it appears below. You will not need to use this code after youve completed the sign-up process. If you do not sign up before the expiration date, you must request a new code. · MobilePro Access Code: [email or web address] Expires: 12/25/2018  2:30 PM 
 
4. Enter the last four digits of your Social Security Number (xxxx) and Date of Birth (mm/dd/yyyy) as indicated and click Submit. You will be taken to the next sign-up page. 5. Create a MobilePro ID. This will be your MobilePro login ID and cannot be changed, so think of one that is secure and easy to remember. 6. Create a MobilePro password. You can change your password at any time. 7. Enter your Password Reset Question and Answer. This can be used at a later time if you forget your password. 8. Enter your e-mail address. You will receive e-mail notification when new information is available in [address] 9. Click Sign Up. You can now view and download portions of your medical record. 10. Click the Download Summary menu link to download a portable copy of your medical information. If you have questions, please visit the Frequently Asked Questions section of the MobilePro website. Remember, MobilePro is NOT to be used for urgent needs. For medical emergencies, dial 911. Now available from your iPhone and Android! Please provide this summary of care documentation to your next provider. Your primary care clinician is listed as [phone] [facility]. If you have any questions after today's visit, please call [phone].

## 2018-09-26 NOTE — PROGRESS NOTES
Room #      SUBJECTIVE:    Diana Joya is a 46 y.o. female who presents today for medication evaluation     1. Have you been to the ER, urgent care clinic since your last visit? Hospitalized since your last visit? NO    2. Have you seen or consulted any other health care providers outside of the [address] [place] [address] since your last visit? Include any pap smears or colon screening. NO  When :  Reason:    Health Maintenance reviewed Yes    Health Maintenance Due   Topic Date Due    Shingrix Vaccine Age 49> (1 of 2) 06/14/2018    BREAST CANCER SCRN MAMMOGRAM  06/14/2018    FOBT Q 1 YEAR AGE 50-75  06/14/2018    Influenza Age 9 to Adult  08/01/2018    PAP AKA CERVICAL CYTOLOGY  10/13/2018

## 2018-10-10 ENCOUNTER — OFFICE VISIT (OUTPATIENT)
Dept: FAMILY MEDICINE CLINIC | Age: 48
End: 2018-10-10

## 2018-10-10 VITALS
RESPIRATION RATE: 19 BRPM | HEIGHT: 67 IN | OXYGEN SATURATION: 96 % | HEART RATE: 105 BPM | DIASTOLIC BLOOD PRESSURE: 66 MMHG | BODY MASS INDEX: 28.91 KG/M2 | TEMPERATURE: 97.3 F | WEIGHT: 184.2 LBS | SYSTOLIC BLOOD PRESSURE: 116 MMHG

## 2018-10-10 DIAGNOSIS — F32.0 CURRENT MILD EPISODE OF MAJOR DEPRESSIVE DISORDER WITHOUT PRIOR EPISODE (HCC): ICD-10-CM

## 2018-10-10 DIAGNOSIS — E03.9 ACQUIRED HYPOTHYROIDISM: ICD-10-CM

## 2018-10-10 DIAGNOSIS — N95.1 PERIMENOPAUSAL: Primary | ICD-10-CM

## 2018-10-10 DIAGNOSIS — F41.9 ANXIETY: ICD-10-CM

## 2018-10-10 RX ORDER — SERTRALINE HYDROCHLORIDE 100 MG/1
150 TABLET, FILM COATED ORAL
Qty: 135 TAB | Refills: 1 | Status: SHIPPED | OUTPATIENT
Start: 2018-10-10 | End: 2018-10-24

## 2018-10-10 RX ORDER — ALPRAZOLAM 1 MG/1
TABLET ORAL
Qty: 40 TAB | Refills: 1 | Status: SHIPPED | OUTPATIENT
Start: 2018-10-10 | End: 2018-10-24

## 2018-10-10 NOTE — PATIENT INSTRUCTIONS
Increase zoloft to 150 ( 1.5tablet) at bedtime  Take xanax 2mg at bedtime if needed  Take xanax during day only if needed  Continue activella every day

## 2018-10-10 NOTE — PROGRESS NOTES
Room #  5    SUBJECTIVE:    John Gil is a 46 y.o. female who presents today for follow up for insomnia, depression. Patient reports getting 7 hours sleep on 10/9/2018    1. Have you been to the ER, urgent care clinic since your last visit? Hospitalized since your last visit? NO    2. Have you seen or consulted any other health care providers outside of the [address] [place] [address] since your last visit? Include any pap smears or colon screening. NO  When :  Reason:    Health Maintenance reviewed Yes    Health Maintenance Due   Topic Date Due    PAP AKA CERVICAL CYTOLOGY  09/29/2017    Shingrix Vaccine Age 50> (1 of 2) 06/14/2018    BREAST CANCER SCRN MAMMOGRAM  06/14/2018    FOBT Q 1 YEAR AGE 50-75  06/14/2018    Influenza Age 9 to Adult  08/01/2018

## 2018-10-10 NOTE — MR AVS SNAPSHOT
[facility] 
 
 
 [address] [facility] [address] [place] [id number] 
[phone] Patient: Eddie Singleton MRN: [id number] Visit Information Date & Time Provider Department Dept. Phone Encounter #  
 10/10/2018 11:00 AM Janice Ferrer [phone] [id number] Follow-up Instructions Return in about 2 weeks (around 10/24/2018) for EOV. Follow-up and Disposition History Your Appointments 10/16/2018  9:00 AM  
[place] [address] [address]) Appt Note: Return in about 4 weeks (around 10/16/2018) for rov, cancel upcoming appt. [address] [facility] [address] [place] [address] [address] [facility]  
  
   
 [address] [facility] [address]  
  
    
 12/12/2018 10:00 AM  
[place] [address] [address]) Appt Note: Return in about 4 months (around 12/15/2018) for physical, labs at next visit, EKG next visit. [address] [facility] [address] [facility] [address] [facility]  
  
   
 [address] [facility] [address] [place] [address] [address] Upcoming Health Maintenance Date Due  
 PAP AKA CERVICAL CYTOLOGY 9/29/2017 Shingrix Vaccine Age 50> (1 of 2) 6/14/2018 BREAST CANCER SCRN MAMMOGRAM 6/14/2018 FOBT Q 1 YEAR AGE 50-75 6/14/2018 Influenza Age 5 to Adult 8/1/2018 DTaP/Tdap/Td series (2 - Td) 10/17/2021 Allergies as of 10/10/2018  Review Complete On: 10/10/2018 By: Karen Avery LPN No Known Allergies Current Immunizations  Reviewed on 10/17/2011 Name Date TDAP Vaccine 10/17/2011 Not reviewed this visit You Were Diagnosed With   
  
 Codes Comments Perimenopausal    -  Primary ICD-10-CM: N95.1 ICD-9-CM: 627.2 Current mild episode of major depressive disorder without prior episode ([facility]     ICD-10-CM: F32.0 ICD-9-CM: 296.21   
 Acquired hypothyroidism     ICD-10-CM: E03.9 ICD-9-CM: 244.9 Anxiety     ICD-10-CM: F41.9 ICD-9-CM: 300.00 Vitals BP Pulse Temp Resp Height(growth percentile) Weight(growth percentile) 116/66 (BP 1 Location: Right arm, BP Patient Position: Sitting) (!) 105 97.3 °F (36.3 °C) (Oral) 19 5' 7\" (1.702 m) 184 lb 3.2 oz (83.6 kg) SpO2 BMI OB Status Smoking Status 96% 28.85 kg/m2 Premenopausal Never Smoker BMI and BSA Data Body Mass Index Body Surface Area  
 28.85 kg/m 2 1.99 m 2 Preferred Pharmacy Pharmacy Name Phone [address] [phone] Your Updated Medication List  
  
   
This list is accurate as of 10/10/18 11:42 AM.  Always use your most recent med list.  
  
  
  
  
 ALPRAZolam 1 mg tablet Commonly known as:  [place] Take 1 mg twice a day and 2 mg in evening. estradiol-norethindrone 1-0.5 mg per tablet Commonly known as:  [place] Take 1 Tab by mouth every morning. Indications: VASOMOTOR SYMPTOMS ASSOCIATED WITH MENOPAUSE  
  
 levothyroxine 150 mcg tablet Commonly known as:  SYNTHROID Take 1 Tab by mouth Daily (before breakfast). Brand name synthroid only  Indications: hypothyroidism  
  
 sertraline 100 mg tablet Commonly known as:  ZOLOFT Take 1.5 Tabs by mouth nightly. Prescriptions Printed Refills ALPRAZolam (XANAX) 1 mg tablet 1 Sig: Take 1 mg twice a day and 2 mg in evening. Class: Print Prescriptions Sent to Pharmacy Refills  
 sertraline (ZOLOFT) 100 mg tablet 1 Sig: Take 1.5 Tabs by mouth nightly. Class: Normal  
 Pharmacy: [address] Ph #: [phone] Route: Oral  
  
Follow-up Instructions Return in about 2 weeks (around 10/24/2018) for EOV. Patient Instructions Increase zoloft to 150 ( 1.5tablet) at bedtime Take xanax 2mg at bedtime if needed Take xanax during day only if needed Continue activella every day Introducing [facility]! [facility] introduces [employer] patient portal. Now you can access parts of your medical record, email your doctor's office, and request medication refills online. 1. In your internet browser, go to [email or web address] 2. Click on the First Time User? Click Here link in the Sign In box. You will see the New Member Sign Up page. 3. Enter your [employer] Access Code exactly as it appears below. You will not need to use this code after youve completed the sign-up process. If you do not sign up before the expiration date, you must request a new code. · [employer] Access Code: [email or web address] Expires: 12/25/2018  2:30 PM 
 
4. Enter the last four digits of your Social Security Number (xxxx) and Date of Birth (mm/dd/yyyy) as indicated and click Submit. You will be taken to the next sign-up page. 5. Create a [employer] ID. This will be your [employer] login ID and cannot be changed, so think of one that is secure and easy to remember. 6. Create a [employer] password. You can change your password at any time. 7. Enter your Password Reset Question and Answer. This can be used at a later time if you forget your password. 8. Enter your e-mail address. You will receive e-mail notification when new information is available in [address] 9. Click Sign Up. You can now view and download portions of your medical record. 10. Click the Download Summary menu link to download a portable copy of your medical information. If you have questions, please visit the Frequently Asked Questions section of the [employer] website. Remember, [employer] is NOT to be used for urgent needs. For medical emergencies, dial 911. Now available from your iPhone and Android! Please provide this summary of care documentation to your next provider. Your primary care clinician is listed as [id number] [facility]. If you have any questions after today's visit, please call [phone].

## 2018-10-10 NOTE — PROGRESS NOTES
Jenn Hyatt is a 46 y.o.  female and presents with    Chief Complaint   Patient presents with    Depression    Anxiety    Menopause           Subjective: Anxiety, depression menopause  Insomnia  Actually slept 7 hours last night -- first time in 3 or 4 months  Still very nervous and upset       Additional Concerns:          Patient Active Problem List   Diagnosis Code    Hypothyroid E03.9    Depression F32.9    Genital herpes A60.00    Perimenopausal N95.1     Patient Active Problem List    Diagnosis Date Noted    Perimenopausal 2017    Hypothyroid 10/17/2011    Depression 10/17/2011    Genital herpes 10/17/2011     Current Outpatient Prescriptions   Medication Sig Dispense Refill    sertraline (ZOLOFT) 100 mg tablet Take 1.5 Tabs by mouth nightly. 135 Tab 1    ALPRAZolam (XANAX) 1 mg tablet Take 1 mg twice a day and 2 mg in evening. 40 Tab 1    estradiol-norethindrone (ACTIVELLA) 1-0.5 mg per tablet Take 1 Tab by mouth every morning. Indications: VASOMOTOR SYMPTOMS ASSOCIATED WITH MENOPAUSE 30 Tab 1    levothyroxine (SYNTHROID) 150 mcg tablet Take 1 Tab by mouth Daily (before breakfast). Brand name synthroid only  Indications: hypothyroidism 80 Tab 4     No Known Allergies  Past Medical History:   Diagnosis Date    Depression 10/17/2011    Hypothyroid 10/17/2011     Past Surgical History:   Procedure Laterality Date    HX  SECTION      times 2     Family History   Problem Relation Age of Onset    Heart Disease Mother     Hypertension Mother      Social History   Substance Use Topics    Smoking status: Never Smoker    Smokeless tobacco: Never Used    Alcohol use Yes       ROS       All other systems reviewed and are negative.       Objective:  Vitals:    10/10/18 1101   BP: 116/66   Pulse: (!) 105   Resp: 19   Temp: 97.3 °F (36.3 °C)   TempSrc: Oral   SpO2: 96%   Weight: 184 lb 3.2 oz (83.6 kg)   Height: 5' 7\" (1.702 m)   PainSc:   0 - No pain alert, well appearing, and in no distress, oriented to person, place, and time and normal appearing weight  Chest - clear to auscultation, no wheezes, rales or rhonchi, symmetric air entry  Heart - normal rate, regular rhythm, normal S1, S2, no murmurs, rubs, clicks or gallops  Abdomen - soft, nontender, nondistended, no masses or organomegaly        LABS     TESTS      Assessment/Plan:    Depression, insomnia, anxiety, menopause  Starting to response to therapy  Will inc erase zoloft to 150/d for now  Continue other meds  F/u  2 wk      Lab review:     Diagnoses and all orders for this visit:    1. Perimenopausal  -     sertraline (ZOLOFT) 100 mg tablet; Take 1.5 Tabs by mouth nightly. -     ALPRAZolam (XANAX) 1 mg tablet; Take 1 mg twice a day and 2 mg in evening. 2. Current mild episode of major depressive disorder without prior episode (HCC)  -     sertraline (ZOLOFT) 100 mg tablet; Take 1.5 Tabs by mouth nightly. -     ALPRAZolam (XANAX) 1 mg tablet; Take 1 mg twice a day and 2 mg in evening. 3. Acquired hypothyroidism  -     sertraline (ZOLOFT) 100 mg tablet; Take 1.5 Tabs by mouth nightly. -     ALPRAZolam (XANAX) 1 mg tablet; Take 1 mg twice a day and 2 mg in evening. 4. Anxiety  -     sertraline (ZOLOFT) 100 mg tablet; Take 1.5 Tabs by mouth nightly. -     ALPRAZolam (XANAX) 1 mg tablet; Take 1 mg twice a day and 2 mg in evening. I have discussed the diagnosis with the patient and the intended plan as seen in the above orders. The patient has received an after-visit summary and questions were answered concerning future plans. I have discussed medication side effects and warnings with the patient as well. I have reviewed the plan of care with the patient, accepted their input and they are in agreement with the treatment goals. Follow-up Disposition:  Return in about 2 weeks (around 10/24/2018) for EOV.

## 2018-10-10 NOTE — LETTER
NOTIFICATION RETURN TO WORK / SCHOOL 
 
10/10/2018 11:53 AM 
 
Ms. Parth Meeks [facility] [address] [address] To Whom It May Concern: 
 
Parth Meeks is currently under the care of [address] [place] [address]. Continues off work until cleared. If there are questions or concerns please have the patient contact our office.  
 
 
 
Sincerely, 
 
 
John Bolden., DO

## 2018-10-24 ENCOUNTER — HOSPITAL ENCOUNTER (OUTPATIENT)
Dept: LAB | Age: 48
Discharge: HOME | End: 2018-10-24

## 2018-10-24 ENCOUNTER — OFFICE VISIT (OUTPATIENT)
Dept: FAMILY MEDICINE CLINIC | Age: 48
End: 2018-10-24

## 2018-10-24 VITALS
WEIGHT: 182 LBS | SYSTOLIC BLOOD PRESSURE: 90 MMHG | TEMPERATURE: 98.2 F | HEIGHT: 67 IN | RESPIRATION RATE: 20 BRPM | OXYGEN SATURATION: 99 % | BODY MASS INDEX: 28.56 KG/M2 | DIASTOLIC BLOOD PRESSURE: 67 MMHG | HEART RATE: 89 BPM

## 2018-10-24 DIAGNOSIS — N95.1 PERIMENOPAUSAL: ICD-10-CM

## 2018-10-24 DIAGNOSIS — F32.0 CURRENT MILD EPISODE OF MAJOR DEPRESSIVE DISORDER WITHOUT PRIOR EPISODE (HCC): ICD-10-CM

## 2018-10-24 DIAGNOSIS — G47.01 INSOMNIA DUE TO MEDICAL CONDITION: ICD-10-CM

## 2018-10-24 DIAGNOSIS — E03.9 ACQUIRED HYPOTHYROIDISM: ICD-10-CM

## 2018-10-24 DIAGNOSIS — E03.9 ACQUIRED HYPOTHYROIDISM: Primary | ICD-10-CM

## 2018-10-24 LAB — SENTARA SPECIMEN COL,SENBCF: NORMAL

## 2018-10-24 PROCEDURE — 99001 SPECIMEN HANDLING PT-LAB: CPT | Performed by: FAMILY MEDICINE

## 2018-10-24 RX ORDER — ALPRAZOLAM 1 MG/1
TABLET ORAL
Qty: 30 TAB | Refills: 0 | Status: SHIPPED | OUTPATIENT
Start: 2018-10-24 | End: 2018-10-29 | Stop reason: DRUGHIGH

## 2018-10-24 RX ORDER — SERTRALINE HYDROCHLORIDE 100 MG/1
200 TABLET, FILM COATED ORAL
Qty: 180 TAB | Refills: 3 | Status: SHIPPED | OUTPATIENT
Start: 2018-10-24 | End: 2018-10-29 | Stop reason: DRUGHIGH

## 2018-10-24 RX ORDER — ESTRADIOL AND NORETHINDRONE ACETATE 1; .5 MG/1; MG/1
1 TABLET ORAL
Qty: 84 TAB | Refills: 4 | Status: SHIPPED | OUTPATIENT
Start: 2018-10-24 | End: 2018-12-12 | Stop reason: SDUPTHER

## 2018-10-24 RX ORDER — ZOLPIDEM TARTRATE 10 MG/1
10 TABLET ORAL
Qty: 30 TAB | Refills: 1 | Status: SHIPPED | OUTPATIENT
Start: 2018-10-24 | End: 2018-10-29 | Stop reason: DRUGHIGH

## 2018-10-24 NOTE — PROGRESS NOTES
Uriel Vinson is a 46 y.o.  female and presents with    Chief Complaint   Patient presents with    Menopause    Depression    Anxiety    Thyroid Problem           Subjective: Thyroid Review:  Patient is seen for followup of hypothyroidism. Thyroid ROS: anxiousness, feeling excessive energy and insomnia. Menopause symptoms better however still having hot flashes and can't afford activella  Anxiety and depression overall better. Only slept 3 hours last night. Additional Concerns:          Patient Active Problem List    Diagnosis Date Noted    Perimenopausal 2017    Hypothyroid 10/17/2011    Depression 10/17/2011    Genital herpes 10/17/2011     Current Outpatient Medications   Medication Sig Dispense Refill    estradiol-norethindrone (ACTIVELLA) 1-0.5 mg per tablet Take 1 Tab by mouth every morning. 84 Tab 4    sertraline (ZOLOFT) 100 mg tablet Take 2 Tabs by mouth nightly. 180 Tab 3    zolpidem (AMBIEN) 10 mg tablet Take 1 Tab by mouth nightly as needed for Sleep. Max Daily Amount: 10 mg. 30 Tab 1    ALPRAZolam (XANAX) 1 mg tablet Take 1 tablet in morning and one in afternoon only if needed for anxiety 30 Tab 0    levothyroxine (SYNTHROID) 150 mcg tablet Take 1 Tab by mouth Daily (before breakfast). Brand name synthroid only  Indications: hypothyroidism 80 Tab 4     No Known Allergies  Past Medical History:   Diagnosis Date    Depression 10/17/2011    Hypothyroid 10/17/2011     Past Surgical History:   Procedure Laterality Date    HX  SECTION      times 2     Family History   Problem Relation Age of Onset    Heart Disease Mother     Hypertension Mother      Social History     Tobacco Use    Smoking status: Never Smoker    Smokeless tobacco: Never Used   Substance Use Topics    Alcohol use: Yes       ROS       All other systems reviewed and are negative.       Objective:  Vitals:    10/24/18 0725   BP: 90/67   Pulse: 89   Resp: 20   Temp: 98.2 °F (36.8 °C) TempSrc: Oral   SpO2: 99%   Weight: 182 lb (82.6 kg)   Height: 5' 7\" (1.702 m)   PainSc:   0 - No pain                 alert, well appearing, and in no distress  Chest - clear to auscultation, no wheezes, rales or rhonchi, symmetric air entry  Heart - normal rate, regular rhythm, normal S1, S2, no murmurs, rubs, clicks or gallops  Abdomen - soft, nontender, nondistended, no masses or organomegaly        LABS     TESTS      Assessment/Plan:    Thyroid check labs to make sure she is not overcorrected  Menopause get generic activella   Depression overall better but still present will try zoloft 200/d for a coule of months. Hopefully begin to taper once she improved  iinsomnia ongiong hopefully zoloft will help. Will also try ambien at hs. Xanax only morning and early afternoon. Lab review:     Diagnoses and all orders for this visit:    1. Acquired hypothyroidism  -     METABOLIC PANEL, COMPREHENSIVE; Future  -     TSH 3RD GENERATION; Future  -     T4, FREE; Future    2. Current mild episode of major depressive disorder without prior episode (HCC)  -     sertraline (ZOLOFT) 100 mg tablet; Take 2 Tabs by mouth nightly. -     zolpidem (AMBIEN) 10 mg tablet; Take 1 Tab by mouth nightly as needed for Sleep. Max Daily Amount: 10 mg.  -     ALPRAZolam (XANAX) 1 mg tablet; Take 1 tablet in morning and one in afternoon only if needed for anxiety  -     METABOLIC PANEL, COMPREHENSIVE; Future  -     TSH 3RD GENERATION; Future  -     T4, FREE; Future    3. Perimenopausal  -     estradiol-norethindrone (ACTIVELLA) 1-0.5 mg per tablet; Take 1 Tab by mouth every morning.  -     METABOLIC PANEL, COMPREHENSIVE; Future  -     TSH 3RD GENERATION; Future  -     T4, FREE; Future    4. Insomnia due to medical condition  -     METABOLIC PANEL, COMPREHENSIVE; Future  -     TSH 3RD GENERATION; Future  -     T4, FREE; Future          I have discussed the diagnosis with the patient and the intended plan as seen in the above orders. The patient has received an after-visit summary and questions were answered concerning future plans. I have discussed medication side effects and warnings with the patient as well. I have reviewed the plan of care with the patient, accepted their input and they are in agreement with the treatment goals.      Follow-up Disposition: Not on File

## 2018-10-24 NOTE — PROGRESS NOTES
Room #  5    SUBJECTIVE:    La Chapa is a 46 y.o. female who presents today for follow up for depression and anxiety    1. Have you been to the ER, urgent care clinic since your last visit? Hospitalized since your last visit? NO    2. Have you seen or consulted any other health care providers outside of the [address] [place] [address] since your last visit? Include any pap smears or colon screening. NO  When :  Reason:    Health Maintenance reviewed Yes    Health Maintenance Due   Topic Date Due    PAP AKA CERVICAL CYTOLOGY  09/29/2017    Shingrix Vaccine Age 50> (1 of 2) 06/14/2018    BREAST CANCER SCRN MAMMOGRAM  06/14/2018    FOBT Q 1 YEAR AGE 50-75  06/14/2018    Influenza Age 9 to Adult  08/01/2018

## 2018-10-24 NOTE — LETTER
NOTIFICATION RETURN TO WORK / SCHOOL 
 
10/24/2018 7:54 AM 
 
Ms. Talha Kim [address] [address] To Whom It May Concern: 
 
Talha Kim is currently under the care of [address] [place] [address]. Off work until cleared. If there are questions or concerns please have the patient contact our office.  
 
 
 
Sincerely, 
 
 
 
 
Malinda Woods., DO

## 2018-10-25 ENCOUNTER — TELEPHONE (OUTPATIENT)
Dept: FAMILY MEDICINE CLINIC | Age: 48
End: 2018-10-25

## 2018-10-25 LAB
A-G RATIO,AGRAT: 1.6 RATIO (ref 1.1–2.6)
ALBUMIN SERPL-MCNC: 4 G/DL (ref 3.5–5)
ALP SERPL-CCNC: 56 U/L (ref 25–115)
ALT SERPL-CCNC: 39 U/L (ref 5–40)
ANION GAP SERPL CALC-SCNC: 19 MMOL/L
AST SERPL W P-5'-P-CCNC: 20 U/L (ref 10–37)
BILIRUB SERPL-MCNC: 0.2 MG/DL (ref 0.2–1.2)
BUN SERPL-MCNC: 9 MG/DL (ref 6–22)
CALCIUM SERPL-MCNC: 9.2 MG/DL (ref 8.4–10.5)
CHLORIDE SERPL-SCNC: 98 MMOL/L (ref 98–110)
CO2 SERPL-SCNC: 23 MMOL/L (ref 20–32)
CREAT SERPL-MCNC: 0.5 MG/DL (ref 0.5–1.2)
GFRAA, 66117: >60
GFRNA, 66118: >60
GLOBULIN,GLOB: 2.5 G/DL (ref 2–4)
GLUCOSE SERPL-MCNC: 105 MG/DL (ref 70–99)
POTASSIUM SERPL-SCNC: 4.4 MMOL/L (ref 3.5–5.5)
PROT SERPL-MCNC: 6.5 G/DL (ref 6.4–8.3)
SODIUM SERPL-SCNC: 140 MMOL/L (ref 133–145)
T4 FREE SERPL-MCNC: 1.9 NG/DL (ref 0.9–1.8)
TSH SERPL DL<=0.005 MIU/L-ACNC: 0.05 MCU/ML (ref 0.27–4.2)

## 2018-10-25 NOTE — TELEPHONE ENCOUNTER
Notify pt that her thyroid is much too high and may be accounting for some of her symptoms. I recommend she discontinue it for now and I will restart on monday at a lower dose.   Dr Christina Hanley

## 2018-10-25 NOTE — TELEPHONE ENCOUNTER
Spoke with Cash Rao, Verified 2 patient identifiers. Spoke with patient in regards to lab results. Relayed Doctor's notes.   Patient acknowledges understanding and voices no further questions or concerns at this time

## 2018-10-25 NOTE — TELEPHONE ENCOUNTER
Patient called into the office this morning to report that she feels scared because she feels she hasnt slept at all last night. Patient reports that she took her antidepressant medication at 7pm and then took her ambien around 930pm as she prepared for bed. Patient confirmed that she did take her medications as prescribed. Patient reports that she doesn't feel like she went to sleep even after taking her ambien. Patient then reports that she then took her xanax around 430am. Patient reports that she feels like her throat and chest is scratchy and repeats that she is unsure if she went to sleep. Patient was educated on allowing the medications to work as the new medication takes time to effectively work into her system. Patient was also provided other methods to assist with bringing her mind and body to rest before bed. Encouraged and demonstrated deep breathing techniques over the phone. Patient reports that she does this and nothing seems to work. Further encouraged patient to find or think of a happy place and begin to meditate to help slow down her thinking process. Placed patient on hold to consult with Dr. Agnieszka Alexis who agrees that patient needs to allow the medication to work. Dr. Agnieszka Alexis also recommended that patient follow up on Monday. Relayed MD recommendations and patient is scheduled for 10/29/18 @830am. Patient continued to address that she felt scared because of her lack of sleep. Provided more reassurance and education on utilizing deep breathing techniques and if necessary to repeat positive affirmations to assist with relaxation. Before ending the call, demonstrated more deep breathing techniques and repeated positive affirmations where patient repeated the affirmations along with demonstrating deep breathing techniques. Caller reminded of her upcoming appointment before ending the call. MD notified of the above.

## 2018-10-29 ENCOUNTER — OFFICE VISIT (OUTPATIENT)
Dept: FAMILY MEDICINE CLINIC | Age: 48
End: 2018-10-29

## 2018-10-29 VITALS
HEIGHT: 67 IN | WEIGHT: 181.6 LBS | BODY MASS INDEX: 28.5 KG/M2 | DIASTOLIC BLOOD PRESSURE: 66 MMHG | SYSTOLIC BLOOD PRESSURE: 100 MMHG | OXYGEN SATURATION: 96 % | RESPIRATION RATE: 19 BRPM | HEART RATE: 85 BPM | TEMPERATURE: 98.6 F

## 2018-10-29 DIAGNOSIS — F32.0 CURRENT MILD EPISODE OF MAJOR DEPRESSIVE DISORDER WITHOUT PRIOR EPISODE (HCC): ICD-10-CM

## 2018-10-29 RX ORDER — ALPRAZOLAM 1 MG/1
TABLET ORAL
Qty: 30 TAB | Refills: 0 | Status: SHIPPED | OUTPATIENT
Start: 2018-10-29 | End: 2018-11-07

## 2018-10-29 RX ORDER — SERTRALINE HYDROCHLORIDE 100 MG/1
100 TABLET, FILM COATED ORAL
Qty: 30 TAB | Refills: 3 | Status: SHIPPED | OUTPATIENT
Start: 2018-10-29 | End: 2018-12-12 | Stop reason: SDUPTHER

## 2018-10-29 RX ORDER — LEVOTHYROXINE SODIUM 137 UG/1
137 TABLET ORAL
Qty: 90 TAB | Refills: 4 | Status: SHIPPED | OUTPATIENT
Start: 2018-10-29 | End: 2018-12-12 | Stop reason: SDUPTHER

## 2018-10-29 RX ORDER — TRAZODONE HYDROCHLORIDE 50 MG/1
50 TABLET ORAL
Qty: 30 TAB | Refills: 0 | Status: SHIPPED | OUTPATIENT
Start: 2018-10-29 | End: 2018-11-07

## 2018-10-29 NOTE — PROGRESS NOTES
Room #  6 SUBJECTIVE: 
 
Eduardo Marinelli is a 46 y.o. female who presents today for follow up for thyroid, anxiety, depression ans insomnia 1. Have you been to the ER, urgent care clinic since your last visit? Hospitalized since your last visit? NO 
 
2. Have you seen or consulted any other health care providers outside of the [facility] since your last visit? Include any pap smears or colon screening. NO When : 
Reason: 
 
Health Maintenance reviewed Yes Health Maintenance Due Topic Date Due  
 PAP AKA CERVICAL CYTOLOGY  09/29/2017  Shingrix Vaccine Age 50> (1 of 2) 06/14/2018  BREAST CANCER SCRN MAMMOGRAM  06/14/2018  FOBT Q 1 YEAR AGE 50-75  06/14/2018  Influenza Age 5 to Adult  08/01/2018

## 2018-10-29 NOTE — PROGRESS NOTES
La Chapa is a 46 y.o.  female and presents with Chief Complaint Patient presents with  Depression  Anxiety  Insomnia  Thyroid Problem  Menopause Subjective: 
Here for f/u I discovered that she was hkyperthyroid last wk and told her to stop synthroid In last 2 night she has been able to sleep Still [id number], anxious. Not flashes Depression Review: 
Patient is seen for followup of depression. Treatment includes SSRI and no other therapies. Ongoing symptoms include depressed mood. She denies fatigue. She experiences the following side effects from the treatment: none. Additional Concerns:   
 
 
 
Patient Active Problem List  
 Diagnosis Date Noted  Perimenopausal 2017  Hypothyroid 10/17/2011  Depression 10/17/2011  Genital herpes 10/17/2011 Current Outpatient Medications Medication Sig Dispense Refill  sertraline (ZOLOFT) 100 mg tablet Take 1 Tab by mouth nightly. 30 Tab 3  
 traZODone (DESYREL) 50 mg tablet Take 1 Tab by mouth nightly as needed for Sleep. 30 Tab 0  ALPRAZolam (XANAX) 1 mg tablet Take 1 tablet in morning and one in afternoon only if needed for anxiety 30 Tab 0  
 levothyroxine (SYNTHROID) 137 mcg tablet Take 137 mcg by mouth Daily (before breakfast). 90 Tab 4  
 estradiol-norethindrone (ACTIVELLA) 1-0.5 mg per tablet Take 1 Tab by mouth every morning. 84 Tab 4 No Known Allergies Past Medical History:  
Diagnosis Date  Depression 10/17/2011  Hypothyroid 10/17/2011 Past Surgical History:  
Procedure Laterality Date  HX  SECTION    
 times 2 Family History Problem Relation Age of Onset  Heart Disease Mother  Hypertension Mother Social History Tobacco Use  Smoking status: Never Smoker  Smokeless tobacco: Never Used Substance Use Topics  Alcohol use: Yes ROS All other systems reviewed and are negative. Objective: 
Vitals:  
 10/29/18 4819 BP: 100/66 Pulse: 85 Resp: 19 Temp: 98.6 °F (37 °C) TempSrc: Oral  
SpO2: 96% Weight: 181 lb 9.6 oz (82.4 kg) Height: 5' 7\" (1.702 m) PainSc:   0 - No pain  
 
 
 
 
 
 
 
alert, well appearing, and in no distress, oriented to person, place, and time and normal appearing weight Chest - clear to auscultation, no wheezes, rales or rhonchi, symmetric air entry Heart - normal rate, regular rhythm, normal S1, S2, no murmurs, rubs, clicks or gallops Abdomen - soft, nontender, nondistended, no masses or organomegaly LABS Component Latest Ref Rng & Units 10/24/2018 10/24/2018 10/24/2018  
 
      8:30 AM  8:30 AM  8:30 AM  
Glucose 70 - 99 mg/dL   105 (H) BUN 
    6 - 22 mg/dL   9 Creatinine 
    0.5 - 1.2 mg/dL   0.5 Sodium 133 - 145 mmol/L   140 Potassium 3.5 - 5.5 mmol/L   4.4 Chloride 98 - 110 mmol/L   98  
CO2 
    20 - 32 mmol/L   23 AST 
    10 - 37 U/L   20 ALT (SGPT) 5 - 40 U/L   39 Alk. phosphatase 25 - 115 U/L   56 Bilirubin, total 
    0.2 - 1.2 mg/dL   0.2 Calcium 8.4 - 10.5 mg/dL   9.2 Protein, total 
    6.4 - 8.3 g/dL   6.5 Albumin 3.5 - 5.0 g/dL   4.0 A-G Ratio 1.1 - 2.6 ratio   1.6 Globulin 2.0 - 4.0 g/dL   2.5 Anion gap 
    mmol/L   19.0 GFRAA 
    >60.0   >60.0 GFRNA 
    >60.0   >60.0 TSH 
    0.27 - 4.20 mcU/mL  0.05 (L) T4, Free 0.9 - 1.8 ng/dL 1.9 (H) TESTS Assessment/Plan:   
Menopause ongoing on activella hot flashes improved Depression ongoing --somehwat better. Will cut zoloft to 100mg/d Insomnia prob ably seconadry to thyroid and depression and menopause -- hopefully thyroid will improved. willl try traozodone at hs prn Anxiety ongiong gbut better will take xanax only if neededc Thyroid has cut out for last few3 days I have told her to wait a couple of more days and then start synthroid 137/d Lab review: labs are reviewed, up to date and normal 
 
 Diagnoses and all orders for this visit: 1. Current mild episode of major depressive disorder without prior episode (HCC) 
-     sertraline (ZOLOFT) 100 mg tablet; Take 1 Tab by mouth nightly. -     traZODone (DESYREL) 50 mg tablet; Take 1 Tab by mouth nightly as needed for Sleep. -     ALPRAZolam (XANAX) 1 mg tablet; Take 1 tablet in morning and one in afternoon only if needed for anxiety 
-     levothyroxine (SYNTHROID) 137 mcg tablet; Take 137 mcg by mouth Daily (before breakfast). I have discussed the diagnosis with the patient and the intended plan as seen in the above orders. The patient has received an after-visit summary and questions were answered concerning future plans. I have discussed medication side effects and warnings with the patient as well. I have reviewed the plan of care with the patient, accepted their input and they are in agreement with the treatment goals. Follow-up Disposition: 
Return in about 1 week (around 11/5/2018) for EOV.

## 2018-11-07 ENCOUNTER — OFFICE VISIT (OUTPATIENT)
Dept: FAMILY MEDICINE CLINIC | Age: 48
End: 2018-11-07

## 2018-11-07 VITALS
DIASTOLIC BLOOD PRESSURE: 62 MMHG | WEIGHT: 181.4 LBS | BODY MASS INDEX: 28.47 KG/M2 | OXYGEN SATURATION: 97 % | RESPIRATION RATE: 20 BRPM | HEIGHT: 67 IN | HEART RATE: 94 BPM | SYSTOLIC BLOOD PRESSURE: 94 MMHG | TEMPERATURE: 97.7 F

## 2018-11-07 DIAGNOSIS — E03.9 ACQUIRED HYPOTHYROIDISM: ICD-10-CM

## 2018-11-07 DIAGNOSIS — N95.1 PERIMENOPAUSAL: ICD-10-CM

## 2018-11-07 DIAGNOSIS — F32.0 CURRENT MILD EPISODE OF MAJOR DEPRESSIVE DISORDER WITHOUT PRIOR EPISODE (HCC): ICD-10-CM

## 2018-11-07 RX ORDER — ALPRAZOLAM 2 MG/1
2 TABLET ORAL
Qty: 30 TAB | Refills: 1 | Status: SHIPPED | OUTPATIENT
Start: 2018-11-07 | End: 2018-12-12 | Stop reason: DRUGHIGH

## 2018-11-07 NOTE — PROGRESS NOTES
Reina Heredia is a 46 y.o.  female and presents with    Chief Complaint   Patient presents with    Depression    Menopause    Insomnia           Subjective:  Feels much better today. Sleep some last night  Less depressed. Able to focus  Would like to go back to work        Additional Concerns:          Patient Active Problem List    Diagnosis Date Noted    Perimenopausal 2017    Hypothyroid 10/17/2011    Depression 10/17/2011    Genital herpes 10/17/2011     Current Outpatient Medications   Medication Sig Dispense Refill    ALPRAZolam (XANAX) 2 mg tablet Take 1 Tab by mouth nightly as needed for Anxiety or Sleep. Max Daily Amount: 2 mg. 30 Tab 1    sertraline (ZOLOFT) 100 mg tablet Take 1 Tab by mouth nightly. 30 Tab 3    levothyroxine (SYNTHROID) 137 mcg tablet Take 137 mcg by mouth Daily (before breakfast). 90 Tab 4    estradiol-norethindrone (ACTIVELLA) 1-0.5 mg per tablet Take 1 Tab by mouth every morning. 80 Tab 4     No Known Allergies  Past Medical History:   Diagnosis Date    Depression 10/17/2011    Hypothyroid 10/17/2011     Past Surgical History:   Procedure Laterality Date    HX  SECTION      times 2     Family History   Problem Relation Age of Onset    Heart Disease Mother     Hypertension Mother      Social History     Tobacco Use    Smoking status: Never Smoker    Smokeless tobacco: Never Used   Substance Use Topics    Alcohol use: Yes       ROS       All other systems reviewed and are negative.       Objective:  Vitals:    18 0802   BP: 94/62   Pulse: 94   Resp: 20   Temp: 97.7 °F (36.5 °C)   TempSrc: Oral   SpO2: 97%   Weight: 181 lb 6.4 oz (82.3 kg)   Height: 5' 7\" (1.702 m)   PainSc:   0 - No pain                 alert, well appearing, and in no distress, oriented to person, place, and time and normal appearing weight  Chest - clear to auscultation, no wheezes, rales or rhonchi, symmetric air entry  Heart - normal rate, regular rhythm, normal S1, S2, no murmurs, rubs, clicks or gallops        LABS     TESTS      Assessment/Plan:    Menopause on activella improved   Depression on zoloft improved  Insomnia still not 100% -- [place] doesn't work. Trazodone doesn't work. Xanax 2mg at bedtome works --   Anxiety not taking anything during the day doing ok  Thyroid new dose on board -- will recheck labs in 1 mo         Lab review: labs are reviewed, up to date and normal, orders written for new lab studies as appropriate; see orders    Diagnoses and all orders for this visit:    1. Acquired hypothyroidism  -     ALPRAZolam (XANAX) 2 mg tablet; Take 1 Tab by mouth nightly as needed for Anxiety or Sleep. Max Daily Amount: 2 mg.  -     TSH 3RD GENERATION; Future  -     T4, FREE; Future  -     METABOLIC PANEL, COMPREHENSIVE; Future    2. Current mild episode of major depressive disorder without prior episode (HCC)  -     ALPRAZolam (XANAX) 2 mg tablet; Take 1 Tab by mouth nightly as needed for Anxiety or Sleep. Max Daily Amount: 2 mg.  -     TSH 3RD GENERATION; Future  -     T4, FREE; Future  -     METABOLIC PANEL, COMPREHENSIVE; Future    3. Perimenopausal  -     ALPRAZolam (XANAX) 2 mg tablet; Take 1 Tab by mouth nightly as needed for Anxiety or Sleep. Max Daily Amount: 2 mg.  -     TSH 3RD GENERATION; Future  -     T4, FREE; Future  -     METABOLIC PANEL, COMPREHENSIVE; Future          I have discussed the diagnosis with the patient and the intended plan as seen in the above orders. The patient has received an after-visit summary and questions were answered concerning future plans. I have discussed medication side effects and warnings with the patient as well. I have reviewed the plan of care with the patient, accepted their input and they are in agreement with the treatment goals.      Follow-up Disposition: Not on File

## 2018-11-07 NOTE — LETTER
NOTIFICATION RETURN TO WORK / SCHOOL 
 
11/7/2018 8:12 AM 
 
Ms. Edouard Monroe [address] [id number] To Whom It May Concern: 
 
Edouard Monroe is currently under the care of [address] [place] [address]. She will return to work/school on: 11/15/2018 If there are questions or concerns please have the patient contact our office.  
 
 
 
Sincerely, 
 
 
Marcelino Wallace., DO

## 2018-11-07 NOTE — PROGRESS NOTES
Room #      SUBJECTIVE:    Loyd Meeks is a 46 y.o. female who presents today for depression, thyroid and insomnia    1. Have you been to the ER, urgent care clinic since your last visit? Hospitalized since your last visit? NO    2. Have you seen or consulted any other health care providers outside of the [address] [place] [address] since your last visit? Include any pap smears or colon screening. NO  When :  Reason:    Health Maintenance reviewed Yes    Health Maintenance Due   Topic Date Due    PAP AKA CERVICAL CYTOLOGY  09/29/2017    Shingrix Vaccine Age 50> (1 of 2) 06/14/2018    BREAST CANCER SCRN MAMMOGRAM  06/14/2018    FOBT Q 1 YEAR AGE 50-75  06/14/2018    Influenza Age 9 to Adult  08/01/2018

## 2018-11-16 ENCOUNTER — TELEPHONE (OUTPATIENT)
Dept: FAMILY MEDICINE CLINIC | Age: 48
End: 2018-11-16

## 2018-11-16 NOTE — TELEPHONE ENCOUNTER
Patient called wanting to know if she can take half of her normal dose of levothyroxine. She is having pounding or thud feeling in her chest, anxiety returning and unable to sleep. Please advise.

## 2018-11-19 NOTE — TELEPHONE ENCOUNTER
Called patient to let her know that  her thyroid may still be too high. .per Dr Andrea Rosales it is  OK to take 1/2 dose levothyroxine for 1 wk. Please Make f/u appt next week to be  Reassess left a message for her to contact office at her earliest convenience.

## 2018-12-05 ENCOUNTER — HOSPITAL ENCOUNTER (OUTPATIENT)
Dept: LAB | Age: 48
Discharge: HOME | End: 2018-12-05

## 2018-12-05 LAB — SENTARA SPECIMEN COL,SENBCF: NORMAL

## 2018-12-05 PROCEDURE — 99001 SPECIMEN HANDLING PT-LAB: CPT

## 2018-12-06 LAB
A-G RATIO,AGRAT: 1.6 RATIO (ref 1.1–2.6)
ALBUMIN SERPL-MCNC: 4.3 G/DL (ref 3.5–5)
ALP SERPL-CCNC: 65 U/L (ref 25–115)
ALT SERPL-CCNC: 12 U/L (ref 5–40)
ANION GAP SERPL CALC-SCNC: 17 MMOL/L
AST SERPL W P-5'-P-CCNC: 15 U/L (ref 10–37)
BILIRUB SERPL-MCNC: 0.3 MG/DL (ref 0.2–1.2)
BUN SERPL-MCNC: 9 MG/DL (ref 6–22)
CALCIUM SERPL-MCNC: 9.6 MG/DL (ref 8.4–10.5)
CHLORIDE SERPL-SCNC: 100 MMOL/L (ref 98–110)
CO2 SERPL-SCNC: 26 MMOL/L (ref 20–32)
CREAT SERPL-MCNC: 0.6 MG/DL (ref 0.5–1.2)
GFRAA, 66117: >60
GFRNA, 66118: >60
GLOBULIN,GLOB: 2.7 G/DL (ref 2–4)
GLUCOSE SERPL-MCNC: 87 MG/DL (ref 70–99)
POTASSIUM SERPL-SCNC: 4.9 MMOL/L (ref 3.5–5.5)
PROT SERPL-MCNC: 7 G/DL (ref 6.4–8.3)
SODIUM SERPL-SCNC: 143 MMOL/L (ref 133–145)
T4 FREE SERPL-MCNC: 1.6 NG/DL (ref 0.9–1.8)
TSH SERPL DL<=0.005 MIU/L-ACNC: 1.4 MCU/ML (ref 0.27–4.2)

## 2018-12-07 DIAGNOSIS — N95.1 PERIMENOPAUSAL: ICD-10-CM

## 2018-12-07 DIAGNOSIS — F32.0 CURRENT MILD EPISODE OF MAJOR DEPRESSIVE DISORDER WITHOUT PRIOR EPISODE (HCC): ICD-10-CM

## 2018-12-07 DIAGNOSIS — E03.9 ACQUIRED HYPOTHYROIDISM: ICD-10-CM

## 2018-12-12 ENCOUNTER — OFFICE VISIT (OUTPATIENT)
Dept: FAMILY MEDICINE CLINIC | Age: 48
End: 2018-12-12

## 2018-12-12 VITALS
TEMPERATURE: 98.1 F | OXYGEN SATURATION: 98 % | BODY MASS INDEX: 27.62 KG/M2 | HEART RATE: 87 BPM | SYSTOLIC BLOOD PRESSURE: 138 MMHG | HEIGHT: 67 IN | DIASTOLIC BLOOD PRESSURE: 80 MMHG | WEIGHT: 176 LBS | RESPIRATION RATE: 20 BRPM

## 2018-12-12 DIAGNOSIS — Z23 ENCOUNTER FOR IMMUNIZATION: ICD-10-CM

## 2018-12-12 DIAGNOSIS — F32.0 CURRENT MILD EPISODE OF MAJOR DEPRESSIVE DISORDER WITHOUT PRIOR EPISODE (HCC): ICD-10-CM

## 2018-12-12 DIAGNOSIS — N95.1 PERIMENOPAUSAL: ICD-10-CM

## 2018-12-12 DIAGNOSIS — Z00.00 PHYSICAL EXAM: Primary | ICD-10-CM

## 2018-12-12 DIAGNOSIS — E03.9 ACQUIRED HYPOTHYROIDISM: ICD-10-CM

## 2018-12-12 RX ORDER — ALPRAZOLAM 1 MG/1
TABLET ORAL
Qty: 60 TAB | Refills: 2 | Status: SHIPPED | OUTPATIENT
Start: 2018-12-12 | End: 2019-03-18 | Stop reason: SDUPTHER

## 2018-12-12 RX ORDER — LEVOTHYROXINE SODIUM 137 UG/1
137 TABLET ORAL
Qty: 90 TAB | Refills: 4 | Status: SHIPPED | OUTPATIENT
Start: 2018-12-12 | End: 2019-03-18 | Stop reason: SDUPTHER

## 2018-12-12 RX ORDER — ESTRADIOL AND NORETHINDRONE ACETATE 1; .5 MG/1; MG/1
1 TABLET ORAL
Qty: 84 TAB | Refills: 4 | Status: SHIPPED | OUTPATIENT
Start: 2018-12-12 | End: 2019-03-18 | Stop reason: SDUPTHER

## 2018-12-12 RX ORDER — SERTRALINE HYDROCHLORIDE 100 MG/1
100 TABLET, FILM COATED ORAL
Qty: 90 TAB | Refills: 4 | Status: SHIPPED | OUTPATIENT
Start: 2018-12-12 | End: 2019-03-18 | Stop reason: SDUPTHER

## 2018-12-12 NOTE — PROGRESS NOTES
Silverio Trotter is a 46 y.o.  female and presents for a preventive health care visit   Subjective:  Health Maintenance History  Immunizations reviewed,  Flu and pneumo  indicated. Mammogram : ordered , dexascan: ordered ,pap smear :,   colonoscopy: referred , Eye exam: na ,  Chest CT: na ,    HPI ;    Patient Active Problem List    Diagnosis Date Noted    Perimenopausal 2017    Hypothyroid 10/17/2011    Depression 10/17/2011    Genital herpes 10/17/2011     Current Outpatient Medications   Medication Sig Dispense Refill    sertraline (ZOLOFT) 100 mg tablet Take 1 Tab by mouth nightly. 90 Tab 4    levothyroxine (SYNTHROID) 137 mcg tablet Take 137 mcg by mouth Daily (before breakfast). 90 Tab 4    estradiol-norethindrone (ACTIVELLA) 1-0.5 mg per tablet Take 1 Tab by mouth every morning.  84 Tab 4    ALPRAZolam (XANAX) 1 mg tablet May take 1 after work and 1 at hs if needed 60 Tab 2     No Known Allergies  Past Medical History:   Diagnosis Date    Depression 10/17/2011    Hypothyroid 10/17/2011     Past Surgical History:   Procedure Laterality Date    HX  SECTION      times 2     Family History   Problem Relation Age of Onset    Heart Disease Mother     Hypertension Mother      Social History     Tobacco Use    Smoking status: Never Smoker    Smokeless tobacco: Never Used   Substance Use Topics    Alcohol use: Yes       ROS       General: negative for - chills, fatigue, fever, weight change  Psych: negative for - anxiety, depression, irritability or mood swings  ENT: negative for - headaches, hearing change, nasal congestion, oral lesions, sneezing or sore throat  Heme/ Lymph: negative for - bleeding problems, bruising, pallor or swollen lymph nodes  Endo: negative for - hot flashes, polydipsia/polyuria or temperature intolerance  Resp: negative for - cough, shortness of breath or wheezing  CV: negative for - chest pain, edema or palpitations  GI: negative for - abdominal pain, change in bowel habits, constipation, diarrhea or nausea/vomiting  : negative for - dysuria, hematuria, incontinence, pelvic pain or vulvar/vaginal symptoms  MSK: negative for - joint pain, joint swelling or muscle pain  Neuro: negative for - confusion, headaches, seizures or weakness  Derm: negative for - dry skin, hair changes, rash or skin lesion changes      Objective:  Vitals:    12/12/18 1008   BP: 138/80   Pulse: 87   Resp: 20   Temp: 98.1 °F (36.7 °C)   TempSrc: Oral   SpO2: 98%   Weight: 176 lb (79.8 kg)   Height: 5' 7\" (1.702 m)   PainSc:   0 - No pain   LMP: 04/12/2018       alert, well appearing, and in no distress, oriented to person, place, and time and normal appearing weight  Mental status - alert, oriented to person, place, and time  Eyes - pupils equal and reactive, extraocular eye movements intact  Ears - bilateral TM's and external ear canals normal  Nose - normal and patent, no erythema, discharge or polyps  Mouth - mucous membranes moist, pharynx normal without lesions  Neck - supple, no significant adenopathy  Lymphatics - no palpable lymphadenopathy, no hepatosplenomegaly  Chest - clear to auscultation, no wheezes, rales or rhonchi, symmetric air entry  Heart - normal rate, regular rhythm, normal S1, S2, no murmurs, rubs, clicks or gallops  Abdomen - soft, nontender, nondistended, no masses or organomegaly  Breasts - breasts appear normal, no suspicious masses, no skin or nipple changes or axillary nodes  Back exam - full range of motion, no tenderness, palpable spasm or pain on motion  Neurological - alert, oriented, normal speech, no focal findings or movement disorder noted  Musculoskeletal - no joint tenderness, deformity or swelling  Extremities - peripheral pulses normal, no pedal edema, no clubbing or cyanosis  Skin - normal coloration and turgor, no rashes, no suspicious skin lesions noted        LABS  Component      Latest Ref Rng & Units 12/5/2018           9:14 AM   Glucose 70 - 99 mg/dL 87   TESTS    ekg  nsr      Assessment/Plan:    Health Maintenance up to date. Recommend f/u physical 1 year. Routine screening labs/tests recommended prior to next physical.              I have discussed the diagnosis with the patient and the intended plan as seen in the above orders. The patient has received an after-visit summary and questions were answered concerning future plans. I have discussed medication side effects and warnings with the patient as well. I have reviewed the plan of care with the patient, accepted their input and they are in agreement with the treatment goals. Follow-up Disposition: Not on File        -------------------------------------------------------------------------------------------------------------------    Problem Assessment  and also with   Chief Complaint   Patient presents with    Thyroid Problem    Depression    Anxiety    Menopause         HPI ; Thyroid Review:  Patient is seen for followup of hypothyroidism. Thyroid ROS: denies fatigue, weight changes, heat/cold intolerance, bowel/skin changes or CVS symptoms. Depression Review:  Patient is seen for followup of depression. Treatment includes Zoloft, Xanax and no other therapies. Ongoing symptoms include depressed mood. She denies depressed mood. She experiences the following side effects from the treatment: none. menopause3 stable  Additional Concerns:              Assessment/Plan:      Thyroid stable optimal  Depression improved     Diagnoses and all orders for this visit:    1. Physical exam  -     AMB POC EKG ROUTINE W/ 12 LEADS, INTER & REP  -     REFERRAL TO GASTROENTEROLOGY  -     DEXA BONE DENSITY STUDY AXIAL; Future  -     [facility] MAMMO BI SCREENING INCL CAD; Future    2. Acquired hypothyroidism  -     REFERRAL TO GASTROENTEROLOGY  -     DEXA BONE DENSITY STUDY AXIAL; Future  -     [facility] MAMMO BI SCREENING INCL CAD; Future    3.  Current mild episode of major depressive disorder without prior episode (HCC)  -     sertraline (ZOLOFT) 100 mg tablet; Take 1 Tab by mouth nightly. -     levothyroxine (SYNTHROID) 137 mcg tablet; Take 137 mcg by mouth Daily (before breakfast). -     ALPRAZolam (XANAX) 1 mg tablet; May take 1 after work and 1 at hs if needed  -     [address] [place] [address]; Future  -     [facility] MAMMO BI SCREENING INCL CAD; Future    4. Perimenopausal  -     estradiol-norethindrone (ACTIVELLA) 1-0.5 mg per tablet; Take 1 Tab by mouth every morning.  -     ALPRAZolam (XANAX) 1 mg tablet; May take 1 after work and 1 at hs if needed  -     [address] [place] [address]; Future  -     [facility] MAMMO BI SCREENING INCL CAD; Future    5.  Encounter for immunization  -     PNEUMOCOCCAL POLYSACCHARIDE VACCINE, 23-VALENT, ADULT OR IMMUNOSUPPRESSED PT DOSE,  -     INFLUENZA VIRUS VAC QUAD,SPLIT,PRESV FREE SYRINGE IM          Lab review: labs are reviewed, up to date and normal

## 2018-12-12 NOTE — PROGRESS NOTES
Room #      SUBJECTIVE:    Vijaya Kunz is a 46 y.o. female who presents today for complete physical with EKG    1. Have you been to the ER, urgent care clinic since your last visit? Hospitalized since your last visit? NO    2. Have you seen or consulted any other health care providers outside of the [address] [place] [address] since your last visit? Include any pap smears or colon screening. NO  When :  Reason:    Health Maintenance reviewed Yes    Health Maintenance Due   Topic Date Due    PAP AKA CERVICAL CYTOLOGY  09/29/2017    Shingrix Vaccine Age 50> (1 of 2) 06/14/2018    BREAST CANCER SCRN MAMMOGRAM  06/14/2018    FOBT Q 1 YEAR AGE 50-75  06/14/2018    Influenza Age 9 to Adult  08/01/2018

## 2019-03-12 ENCOUNTER — HOSPITAL ENCOUNTER (OUTPATIENT)
Dept: LAB | Age: 49
Discharge: HOME | End: 2019-03-12

## 2019-03-12 DIAGNOSIS — E03.9 ACQUIRED HYPOTHYROIDISM: ICD-10-CM

## 2019-03-12 DIAGNOSIS — N95.1 PERIMENOPAUSAL: ICD-10-CM

## 2019-03-12 DIAGNOSIS — F32.0 CURRENT MILD EPISODE OF MAJOR DEPRESSIVE DISORDER WITHOUT PRIOR EPISODE (HCC): ICD-10-CM

## 2019-03-12 DIAGNOSIS — Z00.00 PHYSICAL EXAM: ICD-10-CM

## 2019-03-12 DIAGNOSIS — Z23 ENCOUNTER FOR IMMUNIZATION: ICD-10-CM

## 2019-03-12 LAB — SENTARA SPECIMEN COL,SENBCF: NORMAL

## 2019-03-12 PROCEDURE — 99001 SPECIMEN HANDLING PT-LAB: CPT

## 2019-03-15 LAB
ALBUMIN SERPL-MCNC: 4.2 G/DL (ref 3.5–5.5)
ALBUMIN/GLOB SERPL: 1.4 {RATIO} (ref 1.2–2.2)
ALP SERPL-CCNC: 56 IU/L (ref 39–117)
ALT SERPL-CCNC: 14 IU/L (ref 0–32)
AST SERPL-CCNC: 12 IU/L (ref 0–40)
BASOPHILS # BLD AUTO: 0 X10E3/UL (ref 0–0.2)
BASOPHILS NFR BLD AUTO: 1 %
BILIRUB SERPL-MCNC: 0.5 MG/DL (ref 0–1.2)
BUN SERPL-MCNC: 8 MG/DL (ref 6–24)
BUN/CREAT SERPL: 12 (ref 9–23)
CALCIUM SERPL-MCNC: 9.1 MG/DL (ref 8.7–10.2)
CHLORIDE SERPL-SCNC: 102 MMOL/L (ref 96–106)
CO2 SERPL-SCNC: 23 MMOL/L (ref 20–29)
CREAT SERPL-MCNC: 0.68 MG/DL (ref 0.57–1)
EOSINOPHIL # BLD AUTO: 0.2 X10E3/UL (ref 0–0.4)
EOSINOPHIL NFR BLD AUTO: 4 %
ERYTHROCYTE [DISTWIDTH] IN BLOOD BY AUTOMATED COUNT: 13.8 % (ref 12.3–15.4)
GLOBULIN SER CALC-MCNC: 2.9 G/DL (ref 1.5–4.5)
GLUCOSE SERPL-MCNC: 94 MG/DL (ref 65–99)
HCT VFR BLD AUTO: 44.1 % (ref 34–46.6)
HGB BLD-MCNC: 14.3 G/DL (ref 11.1–15.9)
IMM GRANULOCYTES # BLD AUTO: 0 X10E3/UL (ref 0–0.1)
IMM GRANULOCYTES NFR BLD AUTO: 0 %
LYMPHOCYTES # BLD AUTO: 1.1 X10E3/UL (ref 0.7–3.1)
LYMPHOCYTES NFR BLD AUTO: 21 %
MCH RBC QN AUTO: 30.4 PG (ref 26.6–33)
MCHC RBC AUTO-ENTMCNC: 32.4 G/DL (ref 31.5–35.7)
MCV RBC AUTO: 94 FL (ref 79–97)
MONOCYTES # BLD AUTO: 0.8 X10E3/UL (ref 0.1–0.9)
MONOCYTES NFR BLD AUTO: 14 %
NEUTROPHILS # BLD AUTO: 3.4 X10E3/UL (ref 1.4–7)
NEUTROPHILS NFR BLD AUTO: 60 %
PLATELET # BLD AUTO: 269 X10E3/UL (ref 150–379)
POTASSIUM SERPL-SCNC: 4.7 MMOL/L (ref 3.5–5.2)
PROT SERPL-MCNC: 7.1 G/DL (ref 6–8.5)
RBC # BLD AUTO: 4.71 X10E6/UL (ref 3.77–5.28)
SODIUM SERPL-SCNC: 139 MMOL/L (ref 134–144)
SPECIMEN STATUS REPORT, ROLRST: NORMAL
T4 FREE SERPL-MCNC: 1.84 NG/DL (ref 0.82–1.77)
TSH SERPL DL<=0.005 MIU/L-ACNC: 0.16 UIU/ML (ref 0.45–4.5)
WBC # BLD AUTO: 5.5 X10E3/UL (ref 3.4–10.8)

## 2019-03-18 ENCOUNTER — OFFICE VISIT (OUTPATIENT)
Dept: FAMILY MEDICINE CLINIC | Age: 49
End: 2019-03-18

## 2019-03-18 VITALS
HEART RATE: 86 BPM | WEIGHT: 181 LBS | OXYGEN SATURATION: 98 % | DIASTOLIC BLOOD PRESSURE: 68 MMHG | BODY MASS INDEX: 28.41 KG/M2 | HEIGHT: 67 IN | SYSTOLIC BLOOD PRESSURE: 113 MMHG | TEMPERATURE: 98 F | RESPIRATION RATE: 18 BRPM

## 2019-03-18 DIAGNOSIS — E03.9 ACQUIRED HYPOTHYROIDISM: Primary | ICD-10-CM

## 2019-03-18 DIAGNOSIS — F32.0 CURRENT MILD EPISODE OF MAJOR DEPRESSIVE DISORDER WITHOUT PRIOR EPISODE (HCC): ICD-10-CM

## 2019-03-18 DIAGNOSIS — E03.9 ACQUIRED HYPOTHYROIDISM: ICD-10-CM

## 2019-03-18 DIAGNOSIS — N95.1 PERIMENOPAUSAL: ICD-10-CM

## 2019-03-18 RX ORDER — LEVOTHYROXINE SODIUM 137 UG/1
137 TABLET ORAL
Qty: 90 TAB | Refills: 4 | Status: SHIPPED | OUTPATIENT
Start: 2019-03-18 | End: 2019-07-16 | Stop reason: DRUGHIGH

## 2019-03-18 RX ORDER — SERTRALINE HYDROCHLORIDE 100 MG/1
100 TABLET, FILM COATED ORAL
Qty: 90 TAB | Refills: 4 | Status: SHIPPED | OUTPATIENT
Start: 2019-03-18 | End: 2020-03-31 | Stop reason: SDUPTHER

## 2019-03-18 RX ORDER — ALPRAZOLAM 1 MG/1
TABLET ORAL
Qty: 60 TAB | Refills: 2 | Status: SHIPPED | OUTPATIENT
Start: 2019-03-18 | End: 2019-05-09

## 2019-03-18 RX ORDER — ESTRADIOL AND NORETHINDRONE ACETATE 1; .5 MG/1; MG/1
1 TABLET ORAL
Qty: 84 TAB | Refills: 4 | Status: SHIPPED | OUTPATIENT
Start: 2019-03-18 | End: 2022-09-22

## 2019-03-18 NOTE — PROGRESS NOTES
1. Have you been to the ER, urgent care clinic since your last visit? Hospitalized since your last visit? No    2. Have you seen or consulted any other health care providers outside of the [address] [place] [address] since your last visit? Include any pap smears or colon screening.  No

## 2019-03-18 NOTE — PROGRESS NOTES
Dmitry Ding is a 46 y.o.  female and presents with    Chief Complaint   Patient presents with    Menopause    Depression    Thyroid Problem           Subjective: Thyroid Review:  Patient is seen for followup of hypothyroidism. Thyroid ROS: denies fatigue, weight changes, heat/cold intolerance, bowel/skin changes or CVS symptoms. Depression Review:  Patient is seen for followup of depression. Treatment includes SSRI and no other therapies. Ongoing symptoms include depressed mood. She denies depressed mood. She experiences the following side effects from the treatment: none. Menopause much better at this time     Additional Concerns:          Patient Active Problem List    Diagnosis Date Noted    Perimenopausal 2017    Hypothyroid 10/17/2011    Depression 10/17/2011    Genital herpes 10/17/2011     Current Outpatient Medications   Medication Sig Dispense Refill    sertraline (ZOLOFT) 100 mg tablet Take 1 Tab by mouth nightly. 90 Tab 4    levothyroxine (SYNTHROID) 137 mcg tablet Take 137 mcg by mouth Daily (before breakfast). 90 Tab 4    estradiol-norethindrone (ACTIVELLA) 1-0.5 mg per tablet Take 1 Tab by mouth every morning. 84 Tab 4    ALPRAZolam (XANAX) 1 mg tablet May take 1 after work and 1 at hs if needed 60 Tab 2     No Known Allergies  Past Medical History:   Diagnosis Date    Depression 10/17/2011    Hypothyroid 10/17/2011     Past Surgical History:   Procedure Laterality Date    HX  SECTION      times 2     Family History   Problem Relation Age of Onset    Heart Disease Mother     Hypertension Mother      Social History     Tobacco Use    Smoking status: Never Smoker    Smokeless tobacco: Never Used   Substance Use Topics    Alcohol use: Yes       ROS       All other systems reviewed and are negative.       Objective:  Vitals:    19 0959   BP: 113/68   Pulse: 86   Resp: 18   Temp: 98 °F (36.7 °C)   TempSrc: Oral   SpO2: 98%   Weight: 181 lb (82.1 kg)   Height: 5' 7\" (1.702 m)   PainSc:   0 - No pain                 alert, well appearing, and in no distress and oriented to person, place, and time  Chest - clear to auscultation, no wheezes, rales or rhonchi, symmetric air entry  Heart - normal rate, regular rhythm, normal S1, S2, no murmurs, rubs, clicks or gallops  Abdomen - soft, nontender, nondistended, no masses or organomegaly        LABS   Component      Latest Ref Rng & Units 3/12/2019 3/12/2019           9:10 AM 12:00 AM   WBC      3.4 - 10.8 x10E3/uL  5.5   RBC      3.77 - 5.28 x10E6/uL  4.71   HGB      11.1 - 15.9 g/dL  14.3   HCT      34.0 - 46.6 %  44.1   MCV      79 - 97 fL  94   MCH      26.6 - 33.0 pg  30.4   MCHC      31.5 - 35.7 g/dL  32.4   RDW      12.3 - 15.4 %  13.8   PLATELET      828 - 501 x10E3/uL  269   NEUTROPHILS      Not Estab. %  60   Lymphocytes      Not Estab. %  21   MONOCYTES      Not Estab. %  14   EOSINOPHILS      Not Estab. %  4   BASOPHILS      Not Estab. %  1   ABS. NEUTROPHILS      1.4 - 7.0 x10E3/uL  3.4   Abs Lymphocytes      0.7 - 3.1 x10E3/uL  1.1   ABS. MONOCYTES      0.1 - 0.9 x10E3/uL  0.8   ABS. EOSINOPHILS      0.0 - 0.4 x10E3/uL  0.2   ABS. BASOPHILS      0.0 - 0.2 x10E3/uL  0.0   IMMATURE GRANULOCYTES      Not Estab. %  0   ABS. IMM. GRANS.      0.0 - 0.1 x10E3/uL  0.0   Glucose      65 - 99 mg/dL     BUN      6 - 24 mg/dL     Creatinine      0.57 - 1.00 mg/dL     GFR est non-AA      >59 mL/min/1.73     GFR est AA      >59 mL/min/1.73     BUN/Creatinine ratio      9 - 23     Sodium      134 - 144 mmol/L     Potassium      3.5 - 5.2 mmol/L     Chloride      96 - 106 mmol/L     CO2      20 - 29 mmol/L     Calcium      8.7 - 10.2 mg/dL     Protein, total      6.0 - 8.5 g/dL     Albumin      3.5 - 5.5 g/dL     GLOBULIN, TOTAL      1.5 - 4.5 g/dL     A-G Ratio      1.2 - 2.2     Bilirubin, total      0.0 - 1.2 mg/dL     Alk.  phosphatase      39 - 117 IU/L     AST      0 - 40 IU/L     ALT (SGPT)      0 - 32 IU/L TSH      0.450 - 4.500 uIU/mL     T4, Free      0.82 - 1.77 ng/dL     [facility] SPECIMEN COL       Specimens collected/sent to [facility]      Component      Latest Ref Rng & Units 3/12/2019 3/12/2019          12:00 AM 12:00 AM   WBC      3.4 - 10.8 x10E3/uL     RBC      3.77 - 5.28 x10E6/uL     HGB      11.1 - 15.9 g/dL     HCT      34.0 - 46.6 %     MCV      79 - 97 fL     MCH      26.6 - 33.0 pg     MCHC      31.5 - 35.7 g/dL     RDW      12.3 - 15.4 %     PLATELET      496 - 031 x10E3/uL     NEUTROPHILS      Not Estab. %     Lymphocytes      Not Estab. %     MONOCYTES      Not Estab. %     EOSINOPHILS      Not Estab. %     BASOPHILS      Not Estab. %     ABS. NEUTROPHILS      1.4 - 7.0 x10E3/uL     Abs Lymphocytes      0.7 - 3.1 x10E3/uL     ABS. MONOCYTES      0.1 - 0.9 x10E3/uL     ABS. EOSINOPHILS      0.0 - 0.4 x10E3/uL     ABS. BASOPHILS      0.0 - 0.2 x10E3/uL     IMMATURE GRANULOCYTES      Not Estab. %     ABS. IMM. GRANS.      0.0 - 0.1 x10E3/uL     Glucose      65 - 99 mg/dL  94   BUN      6 - 24 mg/dL  8   Creatinine      0.57 - 1.00 mg/dL  0.68   GFR est non-AA      >59 mL/min/1.73  102   GFR est AA      >59 mL/min/1.73  118   BUN/Creatinine ratio      9 - 23  12   Sodium      134 - 144 mmol/L  139   Potassium      3.5 - 5.2 mmol/L  4.7   Chloride      96 - 106 mmol/L  102   CO2      20 - 29 mmol/L  23   Calcium      8.7 - 10.2 mg/dL  9.1   Protein, total      6.0 - 8.5 g/dL  7.1   Albumin      3.5 - 5.5 g/dL  4.2   GLOBULIN, TOTAL      1.5 - 4.5 g/dL  2.9   A-G Ratio      1.2 - 2.2  1.4   Bilirubin, total      0.0 - 1.2 mg/dL  0.5   Alk.  phosphatase      39 - 117 IU/L  56   AST      0 - 40 IU/L  12   ALT (SGPT)      0 - 32 IU/L  14   TSH      0.450 - 4.500 uIU/mL 0.157 (L)    T4, Free      0.82 - 1.77 ng/dL 1.84 (H)    [facility] SPECIMEN COL             TESTS      Assessment/Plan:    Depression doing great  Menopause doing great  Thyroid a trivial amount high but she doens't want me to change a thing today  Will retest thyroid in 3 mo sooner if a problem      Lab review: labs are reviewed, up to date and normal, orders written for new lab studies as appropriate; see orders    Diagnoses and all orders for this visit:    1. Acquired hypothyroidism  -     TSH 3RD GENERATION; Future  -     T4, FREE; Future    2. Current mild episode of major depressive disorder without prior episode (HCC)  -     sertraline (ZOLOFT) 100 mg tablet; Take 1 Tab by mouth nightly. -     levothyroxine (SYNTHROID) 137 mcg tablet; Take 137 mcg by mouth Daily (before breakfast). -     ALPRAZolam (XANAX) 1 mg tablet; May take 1 after work and 1 at hs if needed    3. Perimenopausal  -     estradiol-norethindrone (ACTIVELLA) 1-0.5 mg per tablet; Take 1 Tab by mouth every morning.  -     ALPRAZolam (XANAX) 1 mg tablet; May take 1 after work and 1 at hs if needed          I have discussed the diagnosis with the patient and the intended plan as seen in the above orders. The patient has received an after-visit summary and questions were answered concerning future plans. I have discussed medication side effects and warnings with the patient as well. I have reviewed the plan of care with the patient, accepted their input and they are in agreement with the treatment goals.      Follow-up Disposition: Not on File

## 2019-04-23 NOTE — TELEPHONE ENCOUNTER
Patient is requesting a refill of ambien due to patient not sleeping anymore. Patient stated Dr. Rhoda Segal prescribed it to her before for that issue and is requesting it again. Last seen 3/18/19. Please advise, thank you.

## 2019-04-23 NOTE — TELEPHONE ENCOUNTER
Attempted to contact patient Maria Alejandra Mora regarding medication request for Scot Newcomer. Relayed doctor's last note that patient is not to take Scot Newcomer and xanax together. Medication request denied. Patient was provided enough xanax to assist with current symptoms and with her inability to sleep. Call back number left if patient had more questions. No further action required.

## 2019-04-24 ENCOUNTER — DOCUMENTATION ONLY (OUTPATIENT)
Dept: FAMILY MEDICINE CLINIC | Age: 49
End: 2019-04-24

## 2019-04-24 NOTE — PROGRESS NOTES
Patient did not arrive to their scheduled appointment on 4/23/19. No show letter #1 sent on 04/24/19. Thank you.

## 2019-05-09 ENCOUNTER — HOSPITAL ENCOUNTER (OUTPATIENT)
Dept: LAB | Age: 49
Discharge: HOME | End: 2019-05-09

## 2019-05-09 ENCOUNTER — OFFICE VISIT (OUTPATIENT)
Dept: FAMILY MEDICINE CLINIC | Age: 49
End: 2019-05-09

## 2019-05-09 VITALS
SYSTOLIC BLOOD PRESSURE: 102 MMHG | TEMPERATURE: 98.2 F | RESPIRATION RATE: 19 BRPM | HEIGHT: 67 IN | HEART RATE: 66 BPM | WEIGHT: 176.8 LBS | OXYGEN SATURATION: 97 % | DIASTOLIC BLOOD PRESSURE: 68 MMHG | BODY MASS INDEX: 27.75 KG/M2

## 2019-05-09 DIAGNOSIS — N95.1 PERIMENOPAUSAL: ICD-10-CM

## 2019-05-09 DIAGNOSIS — E03.9 ACQUIRED HYPOTHYROIDISM: ICD-10-CM

## 2019-05-09 DIAGNOSIS — F32.0 CURRENT MILD EPISODE OF MAJOR DEPRESSIVE DISORDER WITHOUT PRIOR EPISODE (HCC): Primary | ICD-10-CM

## 2019-05-09 LAB — SENTARA SPECIMEN COL,SENBCF: NORMAL

## 2019-05-09 PROCEDURE — 99001 SPECIMEN HANDLING PT-LAB: CPT

## 2019-05-09 RX ORDER — ZOLPIDEM TARTRATE 10 MG/1
10 TABLET ORAL
Qty: 30 TAB | Refills: 5 | Status: SHIPPED | OUTPATIENT
Start: 2019-05-09 | End: 2019-11-12 | Stop reason: SDUPTHER

## 2019-05-09 NOTE — PROGRESS NOTES
Mio Acevedo is a 46 y.o.  female and presents with Chief Complaint Patient presents with  Depression  Menopause  Thyroid Problem Subjective: Thyroid Review: 
Patient is seen for followup of hypothyroidism. Thyroid ROS: denies fatigue, weight changes, heat/cold intolerance, bowel/skin changes or CVS symptoms. Depression Review: 
Patient is seen for followup of depression. Treatment includes Zoloft and no other therapies. Ongoing symptoms include depressed mood. She denies fatigue. She experiences the following side effects from the treatment: none. Perimenopausal on activella. Had 2 menses last month. None for previous months. Additional Concerns:   
 
 
 
Patient Active Problem List  
 Diagnosis Date Noted  Perimenopausal 2017  Hypothyroid 10/17/2011  Depression 10/17/2011  Genital herpes 10/17/2011 Current Outpatient Medications Medication Sig Dispense Refill  zolpidem (AMBIEN) 10 mg tablet Take 1 Tab by mouth nightly as needed for Sleep. Max Daily Amount: 10 mg. 30 Tab 5  sertraline (ZOLOFT) 100 mg tablet Take 1 Tab by mouth nightly. 90 Tab 4  
 levothyroxine (SYNTHROID) 137 mcg tablet Take 137 mcg by mouth Daily (before breakfast). 90 Tab 4  
 estradiol-norethindrone (ACTIVELLA) 1-0.5 mg per tablet Take 1 Tab by mouth every morning. 84 Tab 4 No Known Allergies Past Medical History:  
Diagnosis Date  Depression 10/17/2011  Hypothyroid 10/17/2011 Past Surgical History:  
Procedure Laterality Date  HX  SECTION    
 times 2 Family History Problem Relation Age of Onset  Heart Disease Mother  Hypertension Mother Social History Tobacco Use  Smoking status: Never Smoker  Smokeless tobacco: Never Used Substance Use Topics  Alcohol use: Yes ROS All other systems reviewed and are negative. Objective: 
Vitals:  
 19 0717 BP: 102/68 Pulse: 66 Resp: 19 Temp: 98.2 °F (36.8 °C) TempSrc: Oral  
SpO2: 97% Weight: 176 lb 12.8 oz (80.2 kg) Height: 5' 7\" (1.702 m) PainSc:   0 - No pain LMP: 04/21/2019  
 
 
 
 
 
 
 
alert, well appearing, and in no distress and oriented to person, place, and time Chest - clear to auscultation, no wheezes, rales or rhonchi, symmetric air entry Heart - normal rate, regular rhythm, normal S1, S2, no murmurs, rubs, clicks or gallops Abdomen - soft, nontender, nondistended, no masses or organomegaly LABS TESTS Assessment/Plan:   
Depression very well controlled on zoloft and activella 
perimenoapausal  Much improved continue activella Insomnia will give occ Sandi Bolus Thyroid check levels today adjust if needed Lab review: orders written for new lab studies as appropriate; see orders Diagnoses and all orders for this visit: 1. Current mild episode of major depressive disorder without prior episode (HCC) 
-     zolpidem (AMBIEN) 10 mg tablet; Take 1 Tab by mouth nightly as needed for Sleep. Max Daily Amount: 10 mg. 
 
 
 
 
I have discussed the diagnosis with the patient and the intended plan as seen in the above orders. The patient has received an after-visit summary and questions were answered concerning future plans. I have discussed medication side effects and warnings with the patient as well. I have reviewed the plan of care with the patient, accepted their input and they are in agreement with the treatment goals.

## 2019-05-09 NOTE — PROGRESS NOTES
Room #  4 SUBJECTIVE: 
 
Diana Sainz is a 46 y.o. female who presents today for c/o anxiety and not being able to sleep 1. Have you been to the ER, urgent care clinic since your last visit? Hospitalized since your last visit? NO 
 
2. Have you seen or consulted any other health care providers outside of the [address] [place] [address] since your last visit? Include any pap smears or colon screening. NO When : 
Reason: 
 
Health Maintenance reviewed Yes Health Maintenance Due Topic Date Due  
 PAP AKA CERVICAL CYTOLOGY  09/29/2017  Shingrix Vaccine Age 50> (1 of 2) 06/14/2018  BREAST CANCER SCRN MAMMOGRAM  06/14/2018  FOBT Q 1 YEAR AGE 50-75  06/14/2018

## 2019-05-10 LAB
T4 FREE SERPL-MCNC: 1.5 NG/DL (ref 0.9–1.8)
TSH SERPL DL<=0.005 MIU/L-ACNC: 0.13 MCU/ML (ref 0.27–4.2)

## 2019-07-14 NOTE — PROGRESS NOTES
HISTORY OF PRESENT ILLNESS  Dyan Orlando is a 44 y.o. female. Ms. Mk Tamayo presents to establish care to her PCP retiring from practice. She has chronic problems including acquired hypothyroidism, mild depression and perimenopausal state. Most recent preventative care visit and physical exam was 2018. Establish Care   The history is provided by the patient and medical records. Associated symptoms include headaches (with dehydration). Pertinent negatives include no chest pain, no abdominal pain and no shortness of breath. Mr#: [id number]      Past Medical History:   Diagnosis Date    Depression 10/17/2011    Hypothyroid 10/17/2011       Past Surgical History:   Procedure Laterality Date    HX  SECTION      times 2       Family History   Problem Relation Age of Onset    Heart Disease Mother     Hypertension Mother        No Known Allergies    Social History     Tobacco Use   Smoking Status Never Smoker   Smokeless Tobacco Never Used       Social History     Substance and Sexual Activity   Alcohol Use Yes       Health Maintenance Review:  Immunization History   Administered Date(s) Administered    Influenza Vaccine (Quad) PF 2018    Pneumococcal Polysaccharide (PPSV-23) 2018    TDAP Vaccine 10/17/2011   Pap smear - scheduled this month  Mammogram - due-will discuss with gyn  Colonoscopy - due    Patient Active Problem List   Diagnosis Code    Hypothyroid E03.9    Depression F32.9    Genital herpes A60.00    Perimenopausal N95.1         Current Outpatient Medications:     zolpidem (AMBIEN) 10 mg tablet, Take 1 Tab by mouth nightly as needed for Sleep. Max Daily Amount: 10 mg., Disp: 30 Tab, Rfl: 5    sertraline (ZOLOFT) 100 mg tablet, Take 1 Tab by mouth nightly., Disp: 90 Tab, Rfl: 4    levothyroxine (SYNTHROID) 137 mcg tablet, Take 137 mcg by mouth Daily (before breakfast). , Disp: 90 Tab, Rfl: 4    estradiol-norethindrone (ACTIVELLA) 1-0.5 mg per tablet, Take 1 Tab by mouth every morning., Disp: 84 Tab, Rfl: 4         Review of Systems   Constitutional: Negative for chills, fever and weight loss. HENT: Negative for congestion, hearing loss and sore throat. Eyes: Negative for blurred vision and double vision. Respiratory: Negative for cough, shortness of breath and wheezing. Cardiovascular: Negative for chest pain, palpitations and leg swelling. Gastrointestinal: Positive for nausea (sometimes with anxiety). Negative for abdominal pain, blood in stool, constipation, diarrhea, heartburn, melena and vomiting. Genitourinary: Negative for dysuria and urgency. Oligomenorrhea-perimenopausal   Musculoskeletal: Negative for joint pain and myalgias. Skin: Negative for itching and rash. Neurological: Positive for headaches (with dehydration). Negative for dizziness, tingling, sensory change and focal weakness. Endo/Heme/Allergies: Positive for environmental allergies (seasonal-Flonase, Claritin). Psychiatric/Behavioral: Negative for depression and suicidal ideas. The patient is nervous/anxious (comes and goes). The patient does not have insomnia. Visit Vitals  /80 (BP 1 Location: Left arm, BP Patient Position: Sitting)   Pulse 86   Temp 97.7 °F (36.5 °C) (Oral)   Resp 14   Ht 5' 7\" (1.702 m)   Wt 172 lb 3.2 oz (78.1 kg)   SpO2 98%   BMI 26.97 kg/m²       Physical Exam   Constitutional: She is oriented to person, place, and time. She appears well-developed and well-nourished. HENT:   Head: Normocephalic. Right Ear: Tympanic membrane and ear canal normal.   Left Ear: Tympanic membrane and ear canal normal.   Mouth/Throat: Oropharynx is clear and moist.   Eyes: Pupils are equal, round, and reactive to light. Conjunctivae and EOM are normal.   Neck: Neck supple. Cardiovascular: Normal rate, regular rhythm, normal heart sounds and intact distal pulses. Pulmonary/Chest: Effort normal and breath sounds normal.   Abdominal: Soft.  Bowel sounds are normal. She exhibits no mass. There is no tenderness. Musculoskeletal: She exhibits no edema. Neurological: She is alert and oriented to person, place, and time. She has normal reflexes. Skin: Skin is warm and dry. Psychiatric: She has a normal mood and affect. Her behavior is normal.   Nursing note and vitals reviewed. Results for Alcus Apley (MRN [id number]) as of 7/14/2019 17:50   Ref. Range 11/2/2016 15:28 12/7/2017 08:30   Triglyceride Latest Ref Range: 40 - 149 mg/dL 245 (H) 147   Cholesterol, total Latest Ref Range: 110 - 200 mg/dL 215 (H) 226 (H)   HDL Cholesterol Latest Ref Range: 40 - 59 mg/dL 46 52   LDL, calculated Latest Ref Range: 50 - 99 mg/dL 120 (H) 144 (H)   VLDL, calculated Latest Ref Range: 8 - 30 mg/dL 49 (H) 29   Results for Alcus Apley (MRN [id number]) as of 7/14/2019 17:50   Ref. Range 12/7/2017 08:30 3/12/2019 00:00   WBC Latest Ref Range: 3.4 - 10.8 x10E3/uL 8.0 5.5   RBC Latest Ref Range: 3.77 - 5.28 x10E6/uL 4.64 4.71   HGB Latest Ref Range: 11.1 - 15.9 g/dL 13.9 14.3   HCT Latest Ref Range: 34.0 - 46.6 % 44.1 44.1   MCV Latest Ref Range: 79 - 97 fL 95 94   MCH Latest Ref Range: 26.6 - 33.0 pg 30 30.4   MCHC Latest Ref Range: 31.5 - 35.7 g/dL 32 32.4   RDW Latest Ref Range: 12.3 - 15.4 % 14.2 13.8   PLATELET Latest Ref Range: 150 - 379 x10E3/uL 291 269   MPV Latest Ref Range: 6.0 - 10.8 fL 11.2 (H)    NEUTROPHILS Latest Ref Range: Not Estab. % 62 60   Lymphocytes Latest Ref Range: Not Estab. % 24 (L) 21   MONOCYTES Latest Ref Range: Not Estab. % 9 14   EOSINOPHILS Latest Ref Range: Not Estab. % 4 4   BASOPHILS Latest Ref Range: Not Estab. % 1 1   IMMATURE GRANULOCYTES Latest Ref Range: Not Estab. %  0   ABS. NEUTROPHILS Latest Ref Range: 1.4 - 7.0 x10E3/uL 5.0 3.4   ABS. IMM. GRANS. Latest Ref Range: 0.0 - 0.1 x10E3/uL  0.0   Abs Lymphocytes Latest Ref Range: 0.7 - 3.1 x10E3/uL  1.1   ABS. MONOCYTES Latest Ref Range: 0.1 - 0.9 x10E3/uL 0.7 0.8   ABS.  EOSINOPHILS Latest Ref Range: 0.0 - 0.4 x10E3/uL 0.3 0.2   ABS. BASOPHILS Latest Ref Range: 0.0 - 0.2 x10E3/uL 0.0 0.0   Results for Gemma Blackman (MRN [id number]) as of 7/14/2019 17:50   Ref. Range 12/5/2018 09:14 3/12/2019 00:00   Sodium Latest Ref Range: 134 - 144 mmol/L 143 139   Potassium Latest Ref Range: 3.5 - 5.2 mmol/L 4.9 4.7   Chloride Latest Ref Range: 96 - 106 mmol/L 100 102   CO2 Latest Ref Range: 20 - 29 mmol/L 26 23   Anion gap Latest Units: mmol/L 17.0    Glucose Latest Ref Range: 65 - 99 mg/dL 87 94   BUN Latest Ref Range: 6 - 24 mg/dL 9 8   Creatinine Latest Ref Range: 0.57 - 1.00 mg/dL 0.6 0.68   BUN/Creatinine ratio Latest Ref Range: 9 - 23   12   Calcium Latest Ref Range: 8.7 - 10.2 mg/dL 9.6 9.1   GFR est non-AA Latest Ref Range: >59 mL/min/1.73  102   GFR est AA Latest Ref Range: >59 mL/min/1.73  118   Bilirubin, total Latest Ref Range: 0.0 - 1.2 mg/dL 0.3 0.5   Protein, total Latest Ref Range: 6.0 - 8.5 g/dL 7.0 7.1   Albumin Latest Ref Range: 3.5 - 5.5 g/dL 4.3 4.2   Globulin Latest Ref Range: 2.0 - 4.0 g/dL 2.7    A-G Ratio Latest Ref Range: 1.2 - 2.2  1.6 1.4   ALT (SGPT) Latest Ref Range: 0 - 32 IU/L 12 14   AST Latest Ref Range: 0 - 40 IU/L 15 12   Alk. phosphatase Latest Ref Range: 39 - 117 IU/L 65 56   Results for Gemma Blackman (MRN [id number]) as of 7/14/2019 17:50   Ref. Range 10/24/2018 08:30 12/5/2018 09:14 3/12/2019 00:00 5/9/2019 08:40   T4, Free Latest Ref Range: 0.9 - 1.8 ng/dL 1.9 (H) 1.6 1.84 (H) 1.5   TSH Latest Ref Range: 0.27 - 4.20 mcU/mL 0.05 (L) 1.40 0.157 (L) 0.13 (L)   Glucose Latest Ref Range: 65 - 99 mg/dL 105 (H) 87 94      ASSESSMENT and PLAN    ICD-10-CM ICD-9-CM    1. Acquired hypothyroidism E03.9 244.9 TSH 3RD GENERATION      T4, FREE      TSH 3RD GENERATION      T4, FREE   2. Perimenopausal N95.1 627.2    3. Chronic anxiety F41.9 300.00    4. Mixed hyperlipidemia E78.2 272.2 LIPID PANEL      LIPID PANEL      TSH 3RD GENERATION   5. Chronic insomnia F51.04 780.52    6.  Colon cancer screening Z12.11 V76.51 REFERRAL TO GASTROENTEROLOGY   7. Routine general medical examination at a health care facility Z00.00 V70.0 CBC WITH AUTOMATED DIFF      HEMOGLOBIN A1C WITH EAG      LIPID PANEL      METABOLIC PANEL, COMPREHENSIVE      URINALYSIS W/ RFLX MICROSCOPIC      TSH 3RD GENERATION      T4, FREE   Lab today, further disposition pending lab results. Continue current medications pending lab results. GYN evaluation for Pap smear and mammogram as scheduled. Schedule annual physical exam in December, come by for fasting lab several days prior to the exam appointment. Follow-up sooner with any problems.

## 2019-07-15 ENCOUNTER — OFFICE VISIT (OUTPATIENT)
Dept: FAMILY MEDICINE CLINIC | Age: 49
End: 2019-07-15

## 2019-07-15 VITALS
RESPIRATION RATE: 14 BRPM | WEIGHT: 172.2 LBS | DIASTOLIC BLOOD PRESSURE: 80 MMHG | HEIGHT: 67 IN | TEMPERATURE: 97.7 F | SYSTOLIC BLOOD PRESSURE: 118 MMHG | OXYGEN SATURATION: 98 % | HEART RATE: 86 BPM | BODY MASS INDEX: 27.03 KG/M2

## 2019-07-15 DIAGNOSIS — Z00.00 ROUTINE GENERAL MEDICAL EXAMINATION AT A HEALTH CARE FACILITY: ICD-10-CM

## 2019-07-15 DIAGNOSIS — F41.9 CHRONIC ANXIETY: ICD-10-CM

## 2019-07-15 DIAGNOSIS — E03.9 ACQUIRED HYPOTHYROIDISM: Primary | ICD-10-CM

## 2019-07-15 DIAGNOSIS — Z12.11 COLON CANCER SCREENING: ICD-10-CM

## 2019-07-15 DIAGNOSIS — F51.04 CHRONIC INSOMNIA: ICD-10-CM

## 2019-07-15 DIAGNOSIS — N95.1 PERIMENOPAUSAL: ICD-10-CM

## 2019-07-15 DIAGNOSIS — E78.2 MIXED HYPERLIPIDEMIA: ICD-10-CM

## 2019-07-15 NOTE — PATIENT INSTRUCTIONS
Lab today, further disposition pending lab results. Continue current medications pending lab results. GYN evaluation for Pap smear and mammogram as scheduled. Schedule annual physical exam in December, come by for fasting lab several days prior to the exam appointment. Follow-up sooner with any problems. Menopause Diet: Care Instructions Your Care Instructions Healthy eating helps ease menopause symptoms. And it can reduce your risk for getting conditions such as osteoporosis and heart disease. Follow-up care is a key part of your treatment and safety. Be sure to make and go to all appointments, and call your doctor if you are having problems. It's also a good idea to know your test results and keep a list of the medicines you take. How can you care for yourself at home? · Limit fats in your diet. · Choose foods that have a lot of calcium. The recommended daily intake for adults ages 23 to 48 is 1,000 milligrams (mg). Adults over 50 need 1,200 mg a day. Take a calcium supplement if you don't get enough calcium in the foods you eat. · Add vitamin D to your daily diet. It helps your body use calcium. The recommended daily intake of vitamin D is 600 international units (IU) a day for children and adults up to age 79. Adults age 70 and older need 800 IU a day. Take vitamin D supplements if you need to. · Include good sources of fiber in your diet each day. These include whole grains, beans, fruits, and vegetables. · Avoid simple sugars. This helps if you have mood swings, anxiety, or depression. · Avoid caffeine, or cut back on it. Caffeine can cause sleep problems. It can also make you feel anxious. To relieve these symptoms, pay attention to how much caffeine you are getting in drinks and chocolate. · Limit your intake of alcohol. Heavy drinking tends to make symptoms worse. Where can you learn more? Go to http://rebecca-esperanza.info/. Enter P486 in the search box to learn more about \"Menopause Diet: Care Instructions. \" Current as of: March 28, 2018 Content Version: 11.9 © 0121-9002 [employer]. Care instructions adapted under license by [employer] (which disclaims liability or warranty for this information). If you have questions about a medical condition or this instruction, always ask your healthcare professional. [address] any warranty or liability for your use of this information. Learning About Menopause What is menopause? For most women, menopause is a natural process of aging. Menstrual periods gradually stop. The ability to become pregnant ends. Some women feel relief that their childbearing years are ending. But other women struggle with the physical and emotional changes that come with menopause. For most women, menopause happens around age 48. But every woman's body has its own timeline. Some women stop having periods in their mid-40s. Others keep having periods well into their 50s. And some women go through menopause early because of cancer treatment or surgery to remove the ovaries. What can you expect with menopause? · It starts with perimenopause. This is the process of change that leads up to menopause. Perimenopause can start as early as your late 35s or as late as your early 46s. How long it lasts varies. But it usually lasts from 2 to 8 years. · During this time, your hormone levels will go up and down unevenly (fluctuate). This causes changes in your periods and other symptoms. In time, estrogen and progesterone levels drop enough that the menstrual cycle stops. Going a full year without having a period is usually considered menopause. · Low estrogen levels after menopause speed bone loss. This increases your risk of osteoporosis. Also, your risk of heart disease increases after menopause. · It's normal to have thinner, dryer, wrinkled skin after menopause. The vaginal lining and the lower urinary tract also thin and weaken. This can make it hard to have sex. It can also increase the risk of vaginal and urinary tract infections. What are the symptoms? · Lighter or heavier periods. Your menstrual cycle may be longer or shorter. You may skip periods. · Hot flashes. You may have a sudden feeling of intense body heat. You may sweat, and your head, neck, and chest may get red. Along with hot flashes, you may have a heartbeat that's too fast or not regular. You may also feel anxious or grouchy. In rare cases you might feel panic. · Trouble sleeping. · Mood swings or feeling grouchy, depressed, or worried. · Problems with remembering or thinking clearly. · Vaginal dryness. Some women have only a few mild symptoms. Others have severe symptoms that disrupt their sleep and daily lives. Symptoms tend to last or get worse the first year or more after menopause. Over time, hormones even out at low levels. Many symptoms improve or go away. But some women may have symptoms that don't go away. How are menopause symptoms treated? 
 If you have mild symptoms, you may get some relief if you eat healthy foods, exercise, and lower your stress. Some women choose to take medicines if they have severe symptoms that aren't helped by making changes to their lifestyle. 
 Lifestyle changes 
  · Choose a heart-healthy diet that is low in saturated fat. It should include plenty of fish, fruits, vegetables, beans, and high-fiber grains and breads. Be sure you get enough calcium and vitamin D to help your bones stay strong. Low-fat or nonfat dairy products are a great source of calcium.  
  · Get regular exercise. Exercise can help you manage your weight, keep your heart and bones strong, and lift your mood.  
  · Limit caffeine, alcohol, and stress.  These things can make symptoms worse. Limiting them may help you sleep better.  
  · If you smoke, stop. Quitting smoking can reduce hot flashes and long-term health risks. Medicines 
 If your symptoms are severe, talk with your doctor. You may want to try prescription medicines, such as: 
  · Low-dose birth control pills before menopause.  
  · Low-dose hormone therapy (HT) after menopause.  
  · Antidepressants.  
  · A medicine called clonidine (Catapres) that is usually used to treat high blood pressure.  
 All medicines for menopause symptoms have possible risks or side effects. A very small number of women develop serious health problems when taking hormone therapy. Be sure to talk to your doctor about your possible health risks before you start a treatment for menopause symptoms. 
 Other treatments 
 You can try: 
  · Breathing exercises. They may help reduce hot flashes and emotional symptoms.  
  · Soy. Some women feel that eating lots of soy helps even out their menopause symptoms.  
  · Yoga or biofeedback. They may help reduce stress. Follow-up care is a key part of your treatment and safety. Be sure to make and go to all appointments, and call your doctor if you are having problems. It's also a good idea to know your test results and keep a list of the medicines you take. Where can you learn more? Go to http://rebecca-esperanza.info/. Enter H199 in the search box to learn more about \"Learning About Menopause. \" Current as of: May 14, 2018 Content Version: 11.9 © 7982-9968 [employer]. Care instructions adapted under license by [employer] (which disclaims liability or warranty for this information). If you have questions about a medical condition or this instruction, always ask your healthcare professional. [place] [address] any warranty or liability for your use of this information. Hypothyroidism: Care Instructions Your Care Instructions You have hypothyroidism, which means that your body is not making enough thyroid hormone. This hormone helps your body use energy. If your thyroid level is low, you may feel tired, be constipated, have an increase in your blood pressure, or have dry skin or memory problems. You may also get cold easily, even when it is warm. Women with low thyroid levels may have heavy menstrual periods. A blood test to find your thyroid-stimulating hormone (TSH) level is used to check for hypothyroidism. A high TSH level may mean that you have low thyroid. When your body is not making enough thyroid hormone, TSH levels rise in an effort to make the body produce more. The treatment for hypothyroidism is to take thyroid hormone pills. You should start to feel better in 1 to 2 weeks. But it can take several months to see changes in the TSH level. You will need regular visits with your doctor to make sure you have the right dose of medicine. Most people need treatment for the rest of their lives. You will need to see your doctor regularly to have blood tests and to make sure you are doing well. Follow-up care is a key part of your treatment and safety. Be sure to make and go to all appointments, and call your doctor if you are having problems. It's also a good idea to know your test results and keep a list of the medicines you take. How can you care for yourself at home? · Take your thyroid hormone medicine exactly as prescribed. Call your doctor if you think you are having a problem with your medicine. Most people do not have side effects if they take the right amount of medicine regularly. ? Take the medicine 30 minutes before breakfast, and do not take it with calcium, vitamins, or iron. ? Do not take extra doses of your thyroid medicine. It will not help you get better any faster, and it may cause side effects. ? If you forget to take a dose, do NOT take a double dose of medicine. Take your usual dose the next day. · Tell your doctor about all prescription, herbal, or over-the-counter products you take. · Take care of yourself. Eat a healthy diet, get enough sleep, and get regular exercise. When should you call for help? Call 911 anytime you think you may need emergency care. For example, call if: 
  · You passed out (lost consciousness).  
  · You have severe trouble breathing.  
  · You have a very slow heartbeat (less than 60 beats a minute).  
  · You have a low body temperature (95°F or below).  
 Call your doctor now or seek immediate medical care if: 
  · You feel tired, sluggish, or weak.  
  · You have trouble remembering things or concentrating.  
  · You do not begin to feel better 2 weeks after starting your medicine.  
 Watch closely for changes in your health, and be sure to contact your doctor if you have any problems. Where can you learn more? Go to http://rebecca-esperanza.info/. Enter Q084 in the search box to learn more about \"Hypothyroidism: Care Instructions. \" Current as of: March 14, 2018 Content Version: 11.9 © 6815-0880 [employer]. Care instructions adapted under license by [employer] (which disclaims liability or warranty for this information). If you have questions about a medical condition or this instruction, always ask your healthcare professional. [address] any warranty or liability for your use of this information.

## 2019-07-15 NOTE — PROGRESS NOTES
Tami Beckman is a 44 y.o. female (: 1970) presenting to address:    Chief Complaint   Patient presents with    Establish Care    Thyroid Problem       Vitals:    07/15/19 0900   BP: 118/80   Pulse: 86   Resp: 14   Temp: 97.7 °F (36.5 °C)   TempSrc: Oral   SpO2: 98%   Weight: 172 lb 3.2 oz (78.1 kg)   Height: 5' 7\" (1.702 m)   PainSc:   0 - No pain       Hearing/Vision:   No exam data present    Learning Assessment:     Learning Assessment 7/15/2019   PRIMARY LEARNER Patient   HIGHEST LEVEL OF EDUCATION - PRIMARY LEARNER  > 4 YEARS OF COLLEGE   BARRIERS PRIMARY LEARNER NONE   CO-LEARNER CAREGIVER No   PRIMARY LANGUAGE ENGLISH    NEED No   LEARNER PREFERENCE PRIMARY DEMONSTRATION   ANSWERED BY patient   RELATIONSHIP SELF     Depression Screening:     3 most recent PHQ Screens 2018   PHQ Not Done -   Little interest or pleasure in doing things Nearly every day   Feeling down, depressed, irritable, or hopeless Several days   Total Score PHQ 2 4   Trouble falling or staying asleep, or sleeping too much Nearly every day   Feeling tired or having little energy Nearly every day   Poor appetite, weight loss, or overeating Several days   Feeling bad about yourself - or that you are a failure or have let yourself or your family down Not at all   Trouble concentrating on things such as school, work, reading, or watching TV Several days   Moving or speaking so slowly that other people could have noticed; or the opposite being so fidgety that others notice Several days   Thoughts of being better off dead, or hurting yourself in some way Not at all   PHQ 9 Score 13   How difficult have these problems made it for you to do your work, take care of your home and get along with others Extremely difficult     Fall Risk Assessment:     Fall Risk Assessment, last 12 mths 7/15/2019   Able to walk? Yes   Fall in past 12 months?  No     Abuse Screening:     Abuse Screening Questionnaire 7/15/2019   Do you ever feel afraid of your partner? N   Are you in a relationship with someone who physically or mentally threatens you? N   Is it safe for you to go home? Y     Coordination of Care Questionaire:   1. Have you been to the ER, urgent care clinic since your last visit? Hospitalized since your last visit? NO    2. Have you seen or consulted any other health care providers outside of the [address] [place] [address] since your last visit? Include any pap smears or colon screening. NO    Advanced Directive:   1. Do you have an Advanced Directive? NO    2. Would you like information on Advanced Directives?  NO

## 2019-07-16 DIAGNOSIS — E03.9 ACQUIRED HYPOTHYROIDISM: Primary | ICD-10-CM

## 2019-07-16 LAB
CHOLEST SERPL-MCNC: 199 MG/DL (ref 110–200)
HDLC SERPL-MCNC: 3.8 MG/DL (ref 0–5)
HDLC SERPL-MCNC: 52 MG/DL (ref 40–59)
LDLC SERPL CALC-MCNC: 128 MG/DL (ref 50–99)
T4 FREE SERPL-MCNC: 1.6 NG/DL (ref 0.9–1.8)
TRIGL SERPL-MCNC: 97 MG/DL (ref 40–149)
TSH SERPL DL<=0.005 MIU/L-ACNC: 0.18 MCU/ML (ref 0.27–4.2)
VLDLC SERPL CALC-MCNC: 19 MG/DL (ref 8–30)

## 2019-07-16 RX ORDER — LEVOTHYROXINE SODIUM 125 UG/1
125 TABLET ORAL
Qty: 90 TAB | Refills: 1 | Status: SHIPPED | OUTPATIENT
Start: 2019-07-16 | End: 2020-01-26

## 2019-07-16 NOTE — PROGRESS NOTES
Please advise Ms. Alamo that her lab results show no significant abnormalities except that her thyroid-stimulating hormone level is still too low. That means that her levothyroxine dose is too high. Please ask her to start taking levothyroxine 125 mcg daily instead of 237 mcg daily. Prescription has been sent to her pharmacy. Please ask her to come by the office for repeat lab studies after she has been on the new dose for 2 months. She does not need to fast and does not need an appointment for that.

## 2019-08-21 PROBLEM — D12.6 TUBULAR ADENOMA OF COLON: Status: ACTIVE | Noted: 2019-08-21

## 2019-11-05 ENCOUNTER — LAB ONLY (OUTPATIENT)
Dept: FAMILY MEDICINE CLINIC | Age: 49
End: 2019-11-05

## 2019-11-05 DIAGNOSIS — E03.9 ACQUIRED HYPOTHYROIDISM: ICD-10-CM

## 2019-11-05 DIAGNOSIS — E78.2 MIXED HYPERLIPIDEMIA: ICD-10-CM

## 2019-11-05 DIAGNOSIS — Z00.00 ROUTINE GENERAL MEDICAL EXAMINATION AT A HEALTH CARE FACILITY: ICD-10-CM

## 2019-11-06 LAB
A-G RATIO,AGRAT: 1.6 RATIO (ref 1.1–2.6)
ABSOLUTE LYMPHOCYTE COUNT, 10803: 1.6 K/UL (ref 1–4.8)
ALBUMIN SERPL-MCNC: 4.1 G/DL (ref 3.5–5)
ALP SERPL-CCNC: 54 U/L (ref 25–115)
ALT SERPL-CCNC: 19 U/L (ref 5–40)
ANION GAP SERPL CALC-SCNC: 13 MMOL/L
AST SERPL W P-5'-P-CCNC: 18 U/L (ref 10–37)
AVG GLU, 10930: 105 MG/DL (ref 91–123)
BASOPHILS # BLD: 0.1 K/UL (ref 0–0.2)
BASOPHILS NFR BLD: 1 % (ref 0–2)
BILIRUB SERPL-MCNC: 0.2 MG/DL (ref 0.2–1.2)
BILIRUB UR QL: NEGATIVE
BUN SERPL-MCNC: 8 MG/DL (ref 6–22)
CALCIUM SERPL-MCNC: 8.6 MG/DL (ref 8.4–10.5)
CHLORIDE SERPL-SCNC: 105 MMOL/L (ref 98–110)
CHOLEST SERPL-MCNC: 169 MG/DL (ref 110–200)
CO2 SERPL-SCNC: 25 MMOL/L (ref 20–32)
CREAT SERPL-MCNC: 0.5 MG/DL (ref 0.5–1.2)
EOSINOPHIL # BLD: 0.2 K/UL (ref 0–0.5)
EOSINOPHIL NFR BLD: 4 % (ref 0–6)
ERYTHROCYTE [DISTWIDTH] IN BLOOD BY AUTOMATED COUNT: 13 % (ref 10–15.5)
GFRAA, 66117: >60
GFRNA, 66118: >60
GLOBULIN,GLOB: 2.5 G/DL (ref 2–4)
GLUCOSE SERPL-MCNC: 98 MG/DL (ref 70–99)
GLUCOSE UR QL: NEGATIVE MG/DL
GRANULOCYTES,GRANS: 61 % (ref 40–75)
HBA1C MFR BLD HPLC: 5.3 % (ref 4.8–5.6)
HCT VFR BLD AUTO: 43.4 % (ref 35.1–48)
HDLC SERPL-MCNC: 3.5 MG/DL (ref 0–5)
HDLC SERPL-MCNC: 48 MG/DL (ref 40–59)
HGB BLD-MCNC: 13.4 G/DL (ref 11.7–16)
HGB UR QL STRIP: NEGATIVE
KETONES UR QL STRIP.AUTO: NEGATIVE MG/DL
LDLC SERPL CALC-MCNC: 110 MG/DL (ref 50–99)
LEUKOCYTE ESTERASE: NEGATIVE
LYMPHOCYTES, LYMLT: 25 % (ref 20–45)
MCH RBC QN AUTO: 31 PG (ref 26–34)
MCHC RBC AUTO-ENTMCNC: 31 G/DL (ref 31–36)
MCV RBC AUTO: 99 FL (ref 80–95)
MONOCYTES # BLD: 0.6 K/UL (ref 0.1–1)
MONOCYTES NFR BLD: 9 % (ref 3–12)
NEUTROPHILS # BLD AUTO: 3.8 K/UL (ref 1.8–7.7)
NITRITE UR QL STRIP.AUTO: NEGATIVE
PH UR STRIP: 5 PH (ref 5–8)
PLATELET # BLD AUTO: 270 K/UL (ref 140–440)
PMV BLD AUTO: 10.8 FL (ref 9–13)
POTASSIUM SERPL-SCNC: 4.3 MMOL/L (ref 3.5–5.5)
PROT SERPL-MCNC: 6.6 G/DL (ref 6.4–8.3)
PROT UR QL STRIP: NEGATIVE MG/DL
RBC # BLD AUTO: 4.4 M/UL (ref 3.8–5.2)
SODIUM SERPL-SCNC: 143 MMOL/L (ref 133–145)
SP GR UR: 1.03 (ref 1–1.03)
T4 FREE SERPL-MCNC: 1.4 NG/DL (ref 0.9–1.8)
TRIGL SERPL-MCNC: 57 MG/DL (ref 40–149)
TSH SERPL DL<=0.005 MIU/L-ACNC: 0.35 MCU/ML (ref 0.27–4.2)
UROBILINOGEN UR STRIP-MCNC: <2 MG/DL
VLDLC SERPL CALC-MCNC: 11 MG/DL (ref 8–30)
WBC # BLD AUTO: 6.2 K/UL (ref 4–11)

## 2019-11-06 NOTE — PROGRESS NOTES
Please advised that lab results show thyroid levels back in the normal range on the current dose of levothyroxine which should be continued.   Other results without any significant abnormalities

## 2019-11-12 DIAGNOSIS — F32.0 CURRENT MILD EPISODE OF MAJOR DEPRESSIVE DISORDER WITHOUT PRIOR EPISODE (HCC): ICD-10-CM

## 2019-11-12 NOTE — TELEPHONE ENCOUNTER
Requested Prescriptions     Pending Prescriptions Disp Refills    zolpidem (AMBIEN) 10 mg tablet 30 Tab 5     Sig: Take 1 Tab by mouth nightly as needed for Sleep. Max Daily Amount: 10 mg. Pt called to request a refill because she is completely out of her medication. Please advise. No future appointments.

## 2019-11-13 RX ORDER — ZOLPIDEM TARTRATE 10 MG/1
10 TABLET ORAL
Qty: 30 TAB | Refills: 5 | Status: SHIPPED | OUTPATIENT
Start: 2019-11-13 | End: 2020-05-01

## 2019-11-13 NOTE — TELEPHONE ENCOUNTER
Last seen: 7/15/2019; pt is due for annual physical in Dec 2019    Last filled: 5/9/2019  Medication: ambien 10 mg, qty 30 w/5 refills    Please advise.

## 2019-12-02 DIAGNOSIS — Z00.00 ROUTINE GENERAL MEDICAL EXAMINATION AT A HEALTH CARE FACILITY: ICD-10-CM

## 2019-12-02 DIAGNOSIS — E78.2 MIXED HYPERLIPIDEMIA: ICD-10-CM

## 2019-12-02 DIAGNOSIS — E03.9 ACQUIRED HYPOTHYROIDISM: ICD-10-CM

## 2020-03-31 DIAGNOSIS — F32.0 CURRENT MILD EPISODE OF MAJOR DEPRESSIVE DISORDER WITHOUT PRIOR EPISODE (HCC): ICD-10-CM

## 2020-03-31 NOTE — TELEPHONE ENCOUNTER
Patient called requesting a refill on her medication. Requested Prescriptions     Pending Prescriptions Disp Refills    sertraline (Zoloft) 100 mg tablet 90 Tab 4     Sig: Take 1 Tab by mouth nightly. No future appointments.

## 2020-04-01 RX ORDER — SERTRALINE HYDROCHLORIDE 100 MG/1
100 TABLET, FILM COATED ORAL
Qty: 90 TAB | Refills: 0 | Status: SHIPPED | OUTPATIENT
Start: 2020-04-01 | End: 2020-07-06

## 2020-04-01 NOTE — TELEPHONE ENCOUNTER
Last seen: 7/15/2019  Next O/V: none scheduled  Last filled: 3/18/2019; zoloft 100 mg tab; qty 90 w/4 refills    Patient did not complete labs as requested from last visit.

## 2020-05-01 DIAGNOSIS — F32.0 CURRENT MILD EPISODE OF MAJOR DEPRESSIVE DISORDER WITHOUT PRIOR EPISODE (HCC): ICD-10-CM

## 2020-05-01 RX ORDER — ZOLPIDEM TARTRATE 10 MG/1
TABLET ORAL
Qty: 30 TAB | Refills: 1 | Status: SHIPPED | OUTPATIENT
Start: 2020-05-01 | End: 2020-07-02 | Stop reason: DRUGHIGH

## 2020-05-01 NOTE — TELEPHONE ENCOUNTER
Several attempts made to contact pt; Rx was approved and sent to pharmacy, however, pt is overdue for f/u and labs and no refills w/b approved w/o an office visit.

## 2020-05-01 NOTE — TELEPHONE ENCOUNTER
Please advise, patient was due for follow-up and physical exam in December.   No refills after this without prior office evaluation and follow-up

## 2020-06-30 DIAGNOSIS — F32.0 CURRENT MILD EPISODE OF MAJOR DEPRESSIVE DISORDER WITHOUT PRIOR EPISODE (HCC): ICD-10-CM

## 2020-06-30 RX ORDER — ZOLPIDEM TARTRATE 10 MG/1
TABLET ORAL
Qty: 30 TAB | OUTPATIENT
Start: 2020-06-30

## 2020-06-30 NOTE — TELEPHONE ENCOUNTER
Patient informed and appointment scheduled.     Future Appointments   Date Time Provider Hailey Miguel   7/2/2020 10:00 AM Gemma Gomez MD [facility], [place]

## 2020-07-01 NOTE — PROGRESS NOTES
HISTORY OF PRESENT ILLNESS  Ayaz Dumont is a 44 y.o. female. She presents for annual physical exam, health evaluation, preventative care and follow-up of acquired hypothyroidism and mixed hyperlipidemia      Mr#: [id number]      Past Medical History:   Diagnosis Date    Depression 10/17/2011    Hypothyroid 10/17/2011       Past Surgical History:   Procedure Laterality Date    HX  SECTION      times 2    HX COLONOSCOPY  2019    Single polyp from the transverse colon, tubular adenoma, 5-year follow-up       Family History   Problem Relation Age of Onset    Heart Disease Mother     Hypertension Mother        No Known Allergies    Social History     Tobacco Use   Smoking Status Never Smoker   Smokeless Tobacco Never Used       Social History     Substance and Sexual Activity   Alcohol Use Yes       Health Maintenance Review:  Immunization History   Administered Date(s) Administered    Influenza Vaccine (Quad) PF 2018    Pneumococcal Polysaccharide (PPSV-23) 2018    TDAP Vaccine 10/17/2011       Pap smear -due now  Mammogram -due now  Colonoscopy -2024    Patient Active Problem List   Diagnosis Code    Hypothyroid E03.9    Genital herpes A60.00    Perimenopausal N95.1    Mixed hyperlipidemia E78.2    Tubular adenoma of colon D12.6         Current Outpatient Medications:     zolpidem (AMBIEN) 10 mg tablet, take 1 tablet by mouth at bedtime if needed for sleep, Disp: 30 Tab, Rfl: 1    sertraline (Zoloft) 100 mg tablet, Take 1 Tab by mouth nightly., Disp: 90 Tab, Rfl: 0    levothyroxine (SYNTHROID) 125 mcg tablet, take 1 tablet by mouth once daily before breakfast, Disp: 90 Tab, Rfl: 2    estradiol-norethindrone (ACTIVELLA) 1-0.5 mg per tablet, Take 1 Tab by mouth every morning., Disp: 84 Tab, Rfl: 4       Review of Systems   Constitutional: Negative for chills, fever and weight loss. HENT: Negative for congestion, hearing loss and sore throat.     Eyes: Negative for blurred vision and double vision. Respiratory: Negative for cough, shortness of breath and wheezing. Cardiovascular: Negative for chest pain, palpitations and leg swelling. Gastrointestinal: Negative for abdominal pain, blood in stool, constipation, diarrhea, heartburn, melena, nausea and vomiting. Genitourinary: Negative for dysuria and urgency. Musculoskeletal: Negative for joint pain and myalgias. Skin: Negative for itching and rash. Neurological: Negative for dizziness, tingling, sensory change, focal weakness and headaches. Endo/Heme/Allergies: Positive for environmental allergies. Psychiatric/Behavioral: Positive for depression. Negative for suicidal ideas. The patient is nervous/anxious and has insomnia. Symptoms much improved, has not needed zolpidem x 3 nights     Visit Vitals  /73 (BP 1 Location: Right arm, BP Patient Position: Sitting)   Pulse 83   Temp 97.7 °F (36.5 °C) (Oral)   Resp 18   Ht 5' 7\" (1.702 m)   Wt 191 lb 6.4 oz (86.8 kg)   SpO2 94%   BMI 29.98 kg/m²       Physical Exam  Constitutional:       Appearance: Normal appearance. Comments: 19 pound weight gain in the past year   HENT:      Head: Normocephalic. Right Ear: Tympanic membrane, ear canal and external ear normal.      Left Ear: Tympanic membrane, ear canal and external ear normal.      Mouth/Throat:      Mouth: Mucous membranes are moist.      Pharynx: Oropharynx is clear. Eyes:      Extraocular Movements: Extraocular movements intact. Conjunctiva/sclera: Conjunctivae normal.      Pupils: Pupils are equal, round, and reactive to light. Neck:      Musculoskeletal: Neck supple. Vascular: No carotid bruit. Cardiovascular:      Rate and Rhythm: Normal rate and regular rhythm. Heart sounds: Normal heart sounds. Pulmonary:      Effort: Pulmonary effort is normal.      Breath sounds: Normal breath sounds. Abdominal:      Palpations: Abdomen is soft. Tenderness:  There is no abdominal tenderness. Musculoskeletal:         General: No deformity. Right lower leg: No edema. Left lower leg: No edema. Skin:     General: Skin is warm and dry. Neurological:      General: No focal deficit present. Mental Status: She is alert and oriented to person, place, and time. Psychiatric:         Mood and Affect: Mood normal.         Behavior: Behavior normal.         ASSESSMENT and PLAN    ICD-10-CM ICD-9-CM    1. Routine general medical examination at a health care facility Z00.00 V70.0 CBC WITH AUTOMATED DIFF      HEMOGLOBIN A1C WITH EAG      LIPID PANEL      METABOLIC PANEL, COMPREHENSIVE      URINALYSIS W/ RFLX MICROSCOPIC      TSH 3RD GENERATION      T4, FREE   2. Acquired hypothyroidism E03.9 244.9 TSH 3RD GENERATION      T4, FREE   3. Mixed hyperlipidemia E78.2 272.2 LIPID PANEL   4. Chronic anxiety F41.9 300.00    5.  Chronic insomnia F51.04 780.52 zolpidem (AMBIEN) 5 mg tablet   Lab today, further disposition pending lab results of indicated  Gyn appointment this month for pap smear and Mammogram  Return for follow-up and physical exam in 1 year, sooner with any problems

## 2020-07-02 ENCOUNTER — OFFICE VISIT (OUTPATIENT)
Dept: FAMILY MEDICINE CLINIC | Age: 50
End: 2020-07-02

## 2020-07-02 VITALS
BODY MASS INDEX: 30.04 KG/M2 | RESPIRATION RATE: 18 BRPM | HEIGHT: 67 IN | OXYGEN SATURATION: 94 % | HEART RATE: 83 BPM | SYSTOLIC BLOOD PRESSURE: 106 MMHG | WEIGHT: 191.4 LBS | TEMPERATURE: 97.7 F | DIASTOLIC BLOOD PRESSURE: 73 MMHG

## 2020-07-02 DIAGNOSIS — F51.04 CHRONIC INSOMNIA: ICD-10-CM

## 2020-07-02 DIAGNOSIS — Z00.00 ROUTINE GENERAL MEDICAL EXAMINATION AT A HEALTH CARE FACILITY: Primary | ICD-10-CM

## 2020-07-02 DIAGNOSIS — F41.9 CHRONIC ANXIETY: ICD-10-CM

## 2020-07-02 DIAGNOSIS — E78.2 MIXED HYPERLIPIDEMIA: ICD-10-CM

## 2020-07-02 DIAGNOSIS — E03.9 ACQUIRED HYPOTHYROIDISM: ICD-10-CM

## 2020-07-02 RX ORDER — PROGESTERONE 200 MG/1
CAPSULE ORAL
COMMUNITY
Start: 2020-05-31 | End: 2020-07-02 | Stop reason: SDUPTHER

## 2020-07-02 RX ORDER — PROGESTERONE 100 MG/1
CAPSULE ORAL
COMMUNITY
Start: 2020-06-03 | End: 2022-09-22

## 2020-07-02 RX ORDER — ZOLPIDEM TARTRATE 5 MG/1
5 TABLET ORAL
Qty: 30 TAB | Refills: 2 | Status: SHIPPED | OUTPATIENT
Start: 2020-07-02 | End: 2020-11-10

## 2020-07-02 NOTE — PATIENT INSTRUCTIONS
Lab today, further disposition pending lab results of indicated  Gyn appointment this month for pap smear and Mammogram  Return for follow-up and physical exam in 1 year, sooner with any problems       Well Visit, Women 50 to 72: Care Instructions  Your Care Instructions     Physical exams can help you stay healthy. Your doctor has checked your overall health and may have suggested ways to take good care of yourself. He or she also may have recommended tests. At home, you can help prevent illness with healthy eating, regular exercise, and other steps. Follow-up care is a key part of your treatment and safety. Be sure to make and go to all appointments, and call your doctor if you are having problems. It's also a good idea to know your test results and keep a list of the medicines you take. How can you care for yourself at home? · Reach and stay at a healthy weight. This will lower your risk for many problems, such as obesity, diabetes, heart disease, and high blood pressure. · Get at least 30 minutes of exercise on most days of the week. Walking is a good choice. You also may want to do other activities, such as running, swimming, cycling, or playing tennis or team sports. · Do not smoke. Smoking can make health problems worse. If you need help quitting, talk to your doctor about stop-smoking programs and medicines. These can increase your chances of quitting for good. · Protect your skin from too much sun. When you're outdoors from 10 a.m. to 4 p.m., stay in the shade or cover up with clothing and a hat with a wide brim. Wear sunglasses that block UV rays. Even when it's cloudy, put broad-spectrum sunscreen (SPF 30 or higher) on any exposed skin. · See a dentist one or two times a year for checkups and to have your teeth cleaned. · Wear a seat belt in the car. Follow your doctor's advice about when to have certain tests. These tests can spot problems early. · Cholesterol.  Your doctor will tell you how often to have this done based on your age, family history, or other things that can increase your risk for heart attack and stroke. · Blood pressure. Have your blood pressure checked during a routine doctor visit. Your doctor will tell you how often to check your blood pressure based on your age, your blood pressure results, and other factors. · Mammogram. Ask your doctor how often you should have a mammogram, which is an X-ray of your breasts. A mammogram can spot breast cancer before it can be felt and when it is easiest to treat. · Pap test and pelvic exam. Ask your doctor how often you should have a Pap test. You may not need to have a Pap test as often as you used to. · Vision. Have your eyes checked every year or two or as often as your doctor suggests. Some experts recommend that you have yearly exams for glaucoma and other age-related eye problems starting at age 48. · Hearing. Tell your doctor if you notice any change in your hearing. You can have tests to find out how well you hear. · Diabetes. Ask your doctor whether you should have tests for diabetes. · Colorectal cancer. Your risk for colorectal cancer gets higher as you get older. Some experts say that adults should start regular screening at age 48 and stop at age 76. Others say to start before age 48 or continue after age 76. Talk with your doctor about your risk and when to start and stop screening. · Thyroid disease. Talk to your doctor about whether to have your thyroid checked as part of a regular physical exam. Women have an increased chance of a thyroid problem. · Osteoporosis. You should begin tests for bone density at age 72. If you are younger than 72, ask your doctor whether you have factors that may increase your risk for this disease. You may want to have this test before age 72. · Heart attack and stroke risk. At least every 4 to 6 years, you should have your risk for heart attack and stroke assessed.  Your doctor uses factors such as your age, blood pressure, cholesterol, and whether you smoke or have diabetes to show what your risk for a heart attack or stroke is over the next 10 years. When should you call for help? Watch closely for changes in your health, and be sure to contact your doctor if you have any problems or symptoms that concern you. Where can you learn more? Go to http://rebecca-esperanza.info/  Enter [id number] in the search box to learn more about \"Well Visit, Women 50 to 72: Care Instructions. \"  Current as of: August 22, 2019               Content Version: 12.5  © 8416-2496 [employer]. Care instructions adapted under license by [employer] (which disclaims liability or warranty for this information). If you have questions about a medical condition or this instruction, always ask your healthcare professional. [address] any warranty or liability for your use of this information.

## 2020-07-02 NOTE — PROGRESS NOTES
Diego Alfredo is a 44 y.o. female (: 1970) presenting to address:    Chief Complaint   Patient presents with    Physical       Vitals:    20 0945   BP: 106/73   Pulse: 83   Resp: 18   Temp: 97.7 °F (36.5 °C)   TempSrc: Oral   SpO2: 94%   Weight: 191 lb 6.4 oz (86.8 kg)   Height: 5' 7\" (1.702 m)   PainSc:   0 - No pain       Hearing/Vision:   No exam data present    Learning Assessment:     Learning Assessment 7/15/2019   PRIMARY LEARNER Patient   HIGHEST LEVEL OF EDUCATION - PRIMARY LEARNER  > 4 YEARS OF COLLEGE   BARRIERS PRIMARY LEARNER NONE   CO-LEARNER CAREGIVER No   PRIMARY LANGUAGE ENGLISH    NEED No   LEARNER PREFERENCE PRIMARY DEMONSTRATION   ANSWERED BY patient   RELATIONSHIP SELF     Depression Screening:     3 most recent PHQ Screens 2020   PHQ Not Done -   Little interest or pleasure in doing things Not at all   Feeling down, depressed, irritable, or hopeless Not at all   Total Score PHQ 2 0   Trouble falling or staying asleep, or sleeping too much -   Feeling tired or having little energy -   Poor appetite, weight loss, or overeating -   Feeling bad about yourself - or that you are a failure or have let yourself or your family down -   Trouble concentrating on things such as school, work, reading, or watching TV -   Moving or speaking so slowly that other people could have noticed; or the opposite being so fidgety that others notice -   Thoughts of being better off dead, or hurting yourself in some way -   PHQ 9 Score -   How difficult have these problems made it for you to do your work, take care of your home and get along with others -     Fall Risk Assessment:     Fall Risk Assessment, last 12 mths 7/15/2019   Able to walk? Yes   Fall in past 12 months? No     Abuse Screening:     Abuse Screening Questionnaire 2020   Do you ever feel afraid of your partner? N   Are you in a relationship with someone who physically or mentally threatens you?  N   Is it safe for you to go home? Y     Coordination of Care Questionaire:   1. Have you been to the ER, urgent care clinic since your last visit? Hospitalized since your last visit? NO    2. Have you seen or consulted any other health care providers outside of the [address] [place] [address] since your last visit? Include any pap smears or colon screening. NO    Advanced Directive:   1. Do you have an Advanced Directive? NO    2. Would you like information on Advanced Directives?  NO

## 2020-07-03 LAB
AVG GLU, 10930: 112 MG/DL (ref 91–123)
BILIRUB UR QL: NEGATIVE
EPITHELIAL,EPSU: ABNORMAL /HPF (ref 0–2)
GLUCOSE UR QL: NEGATIVE MG/DL
HBA1C MFR BLD HPLC: 5.5 % (ref 4.8–5.6)
HGB UR QL STRIP: NEGATIVE
KETONES UR QL STRIP.AUTO: NEGATIVE MG/DL
LEUKOCYTE ESTERASE: NEGATIVE
NITRITE UR QL STRIP.AUTO: NEGATIVE
PH UR STRIP: 5 PH (ref 5–8)
PROT UR QL STRIP: NEGATIVE MG/DL
RBC #/AREA URNS HPF: NEGATIVE /HPF
SP GR UR: 1.03 (ref 1–1.03)
UROBILINOGEN UR STRIP-MCNC: <2 MG/DL
WBC URNS QL MICRO: ABNORMAL /HPF (ref 0–2)

## 2020-07-05 NOTE — PROGRESS NOTES
Please advise that lab results show thyroid levels in the normal range on the current dose of levothyroxine that should be continued. Otherwise the results are also essentially normal except for somewhat elevated cholesterol levels which should be addressed by avoiding dietary starch and sugar and following a program of regular aerobic exercise.

## 2020-12-08 DIAGNOSIS — E03.9 ACQUIRED HYPOTHYROIDISM: ICD-10-CM

## 2020-12-08 RX ORDER — LEVOTHYROXINE SODIUM 125 UG/1
TABLET ORAL
Qty: 90 TAB | Refills: 2 | Status: SHIPPED | OUTPATIENT
Start: 2020-12-08 | End: 2021-12-04

## 2021-07-06 DIAGNOSIS — F51.04 CHRONIC INSOMNIA: ICD-10-CM

## 2021-07-07 DIAGNOSIS — E78.2 MIXED HYPERLIPIDEMIA: ICD-10-CM

## 2021-07-07 DIAGNOSIS — Z00.00 ROUTINE HEALTH MAINTENANCE: Primary | ICD-10-CM

## 2021-07-07 DIAGNOSIS — E03.9 ACQUIRED HYPOTHYROIDISM: ICD-10-CM

## 2021-07-07 RX ORDER — ZOLPIDEM TARTRATE 5 MG/1
TABLET ORAL
Qty: 30 TABLET | Refills: 0 | Status: SHIPPED | OUTPATIENT
Start: 2021-07-07 | End: 2021-07-26 | Stop reason: SDUPTHER

## 2021-07-07 NOTE — TELEPHONE ENCOUNTER
Zolpidem has been refilled for 30 days however the patient is due this month for lab, annual physical exam and follow-up. Please assist her with scheduling the necessary appointments. Lab orders have been entered. Since zolpidem is a controlled medication will not be able to refill this again without appropriate prior evaluation as above.

## 2021-07-07 NOTE — TELEPHONE ENCOUNTER
Attempted to call pt to inform, Zolpidem has been refilled for 30 days however the patient is due this month for lab, annual physical exam and follow-up. Since zolpidem is a controlled medication will not be able to refill this again without appropriate prior evaluation as above.

## 2021-07-24 NOTE — PROGRESS NOTES
HISTORY OF PRESENT ILLNESS  Kimberly Love is a 46 y.o. female. She presents for health assessment, preventative care and follow-up with a history of hyperlipidemia, acquired hypothyroidism, depression with chronic insomnia and previous diagnosis with tubular adenoma of the colon. Mr#: [id number]      Past Medical History:   Diagnosis Date    Depression 10/17/2011    Hypothyroid 10/17/2011       Past Surgical History:   Procedure Laterality Date    HX  SECTION      times 2    HX COLONOSCOPY  2019    Single polyp from the transverse colon, tubular adenoma, 5-year follow-up       Family History   Problem Relation Age of Onset    Heart Disease Mother     Hypertension Mother        No Known Allergies    Social History     Tobacco Use   Smoking Status Never Smoker   Smokeless Tobacco Never Used       Social History     Substance and Sexual Activity   Alcohol Use Yes         Patient Active Problem List   Diagnosis Code    Hypothyroid E03.9    Genital herpes A60.00    Perimenopausal N95.1    Mixed hyperlipidemia E78.2    Tubular adenoma of colon D12.6         Current Outpatient Medications:     zolpidem (AMBIEN) 5 mg tablet, take 1 tablet by mouth at bedtime if needed for sleep, Disp: 30 Tablet, Rfl: 0    sertraline (ZOLOFT) 100 mg tablet, take 1 tablet by mouth nightly, Disp: 90 Tablet, Rfl: 0    levothyroxine (SYNTHROID) 125 mcg tablet, take 1 tablet by mouth once daily before breakfast, Disp: 90 Tab, Rfl: 2    progesterone (PROMETRIUM) 100 mg capsule, take 1 capsule by mouth at bedtime, Disp: , Rfl:     estradiol-norethindrone (ACTIVELLA) 1-0.5 mg per tablet, Take 1 Tab by mouth every morning., Disp: 84 Tab, Rfl: 4       Review of Systems   Constitutional: Negative for chills, fever and weight loss. HENT: Negative for congestion, ear pain, hearing loss and sore throat. Eyes: Negative for blurred vision and double vision.    Respiratory: Negative for cough, shortness of breath and wheezing. Cardiovascular: Negative for chest pain, palpitations and leg swelling. Gastrointestinal: Negative for abdominal pain, blood in stool, constipation, diarrhea, heartburn, melena, nausea and vomiting. Genitourinary: Negative for dysuria and urgency. Menopausal symptoms   Musculoskeletal: Negative for joint pain and myalgias. Skin: Negative for itching and rash. Neurological: Negative for dizziness, tingling, sensory change, focal weakness and headaches. Endo/Heme/Allergies: Positive for environmental allergies. Psychiatric/Behavioral: Negative for depression. The patient is nervous/anxious and has insomnia. Visit Vitals  /80 (BP 1 Location: Right arm, BP Patient Position: Sitting, BP Cuff Size: Large adult)   Pulse 89   Temp 97.8 °F (36.6 °C) (Temporal)   Resp 14   Ht 5' 7\" (1.702 m)   Wt 209 lb 9.6 oz (95.1 kg)   SpO2 97%   BMI 32.83 kg/m²       Physical Exam  Vitals and nursing note reviewed. Constitutional:       General: She is not in acute distress. Appearance: Normal appearance. She is not ill-appearing. HENT:      Head: Normocephalic. Right Ear: Tympanic membrane, ear canal and external ear normal.      Left Ear: Tympanic membrane, ear canal and external ear normal.   Eyes:      Extraocular Movements: Extraocular movements intact. Conjunctiva/sclera: Conjunctivae normal.      Pupils: Pupils are equal, round, and reactive to light. Neck:      Vascular: No carotid bruit. Cardiovascular:      Rate and Rhythm: Normal rate and regular rhythm. Heart sounds: Normal heart sounds. Pulmonary:      Effort: Pulmonary effort is normal.      Breath sounds: Normal breath sounds. Abdominal:      Palpations: Abdomen is soft. Tenderness: There is no abdominal tenderness. Musculoskeletal:         General: No deformity. Cervical back: Neck supple. Right lower leg: No edema. Left lower leg: No edema.    Skin:     General: Skin is warm and dry.   Neurological:      Mental Status: She is alert and oriented to person, place, and time. Psychiatric:         Mood and Affect: Mood normal.         Behavior: Behavior normal.         ASSESSMENT and PLAN    ICD-10-CM ICD-9-CM    1. Routine general medical examination at a health care facility  Z00.00 V70.0 CBC WITH AUTOMATED DIFF      HEMOGLOBIN A1C WITH EAG      URINALYSIS W/ RFLX MICROSCOPIC      TSH 3RD GENERATION      T4, FREE      METABOLIC PANEL, COMPREHENSIVE      LIPID PANEL   2. Mixed hyperlipidemia  E78.2 272.2 LIPID PANEL   3. Acquired hypothyroidism  E03.9 244.9 TSH 3RD GENERATION      T4, FREE   4. Current mild episode of major depressive disorder without prior episode (HCC)  F32.0 296.21 sertraline (ZOLOFT) 100 mg tablet   5. Chronic insomnia  F51.04 780.52 zolpidem (AMBIEN) 5 mg tablet   6. Tubular adenoma of colon  D12.6 211.3    Assessment:  Status of hyperlipidemia and acquired hypothyroidism to be determined pending lab results  Depression and insomnia adequately treated with current medication  Surveillance for carcinoma of the colon up-to-date    Health maintenance:  Mammogram-due in August  Pap smear-up to date  Shingrix immunization - today    Plan:  Shingrix #1 today, return with a nurse visit in 2-6 months for Shingrix #2  Lab studies ordered, further disposition pending lab results if indicated  Continue current medications pending lab results  Return for annual physical exam and follow-up in 1 year or sooner if any problems    Kaz Stern MD      PLEASE NOTE:   This document has been produced using voice recognition software. Unrecognized errors in transcription may be present.

## 2021-07-26 ENCOUNTER — OFFICE VISIT (OUTPATIENT)
Dept: FAMILY MEDICINE CLINIC | Age: 51
End: 2021-07-26
Payer: COMMERCIAL

## 2021-07-26 ENCOUNTER — APPOINTMENT (OUTPATIENT)
Dept: FAMILY MEDICINE CLINIC | Age: 51
End: 2021-07-26

## 2021-07-26 VITALS
DIASTOLIC BLOOD PRESSURE: 80 MMHG | SYSTOLIC BLOOD PRESSURE: 110 MMHG | BODY MASS INDEX: 32.9 KG/M2 | TEMPERATURE: 97.8 F | RESPIRATION RATE: 14 BRPM | HEIGHT: 67 IN | WEIGHT: 209.6 LBS | OXYGEN SATURATION: 97 % | HEART RATE: 89 BPM

## 2021-07-26 DIAGNOSIS — E03.9 ACQUIRED HYPOTHYROIDISM: ICD-10-CM

## 2021-07-26 DIAGNOSIS — Z23 ENCOUNTER FOR IMMUNIZATION: ICD-10-CM

## 2021-07-26 DIAGNOSIS — Z00.00 ROUTINE GENERAL MEDICAL EXAMINATION AT A HEALTH CARE FACILITY: Primary | ICD-10-CM

## 2021-07-26 DIAGNOSIS — E78.2 MIXED HYPERLIPIDEMIA: ICD-10-CM

## 2021-07-26 DIAGNOSIS — F51.04 CHRONIC INSOMNIA: ICD-10-CM

## 2021-07-26 DIAGNOSIS — F32.0 CURRENT MILD EPISODE OF MAJOR DEPRESSIVE DISORDER WITHOUT PRIOR EPISODE (HCC): ICD-10-CM

## 2021-07-26 DIAGNOSIS — Z00.00 ROUTINE GENERAL MEDICAL EXAMINATION AT A HEALTH CARE FACILITY: ICD-10-CM

## 2021-07-26 DIAGNOSIS — D12.6 TUBULAR ADENOMA OF COLON: ICD-10-CM

## 2021-07-26 PROCEDURE — 90750 HZV VACC RECOMBINANT IM: CPT | Performed by: FAMILY MEDICINE

## 2021-07-26 PROCEDURE — 99396 PREV VISIT EST AGE 40-64: CPT | Performed by: FAMILY MEDICINE

## 2021-07-26 RX ORDER — SERTRALINE HYDROCHLORIDE 100 MG/1
TABLET, FILM COATED ORAL
Qty: 90 TABLET | Refills: 3 | Status: SHIPPED | OUTPATIENT
Start: 2021-07-26 | End: 2022-08-26

## 2021-07-26 RX ORDER — ZOLPIDEM TARTRATE 5 MG/1
TABLET ORAL
Qty: 90 TABLET | Refills: 3 | Status: SHIPPED | OUTPATIENT
Start: 2021-07-26 | End: 2022-02-17

## 2021-07-26 NOTE — PATIENT INSTRUCTIONS
Shingrix #1 today, return with a nurse visit in 2-6 months for Shingrix #2  Lab studies ordered, further disposition pending lab results if indicated  Continue current medications pending lab results  Return for annual physical exam and follow-up in 1 year or sooner if any problems

## 2021-07-26 NOTE — PROGRESS NOTES
Alex Conroy presents today for   Chief Complaint   Patient presents with    Physical       Is someone accompanying this pt? no    Is the patient using any DME equipment during OV? no    Depression Screening:  3 most recent PHQ Screens 7/2/2020   PHQ Not Done -   Little interest or pleasure in doing things Not at all   Feeling down, depressed, irritable, or hopeless Not at all   Total Score PHQ 2 0   Trouble falling or staying asleep, or sleeping too much -   Feeling tired or having little energy -   Poor appetite, weight loss, or overeating -   Feeling bad about yourself - or that you are a failure or have let yourself or your family down -   Trouble concentrating on things such as school, work, reading, or watching TV -   Moving or speaking so slowly that other people could have noticed; or the opposite being so fidgety that others notice -   Thoughts of being better off dead, or hurting yourself in some way -   PHQ 9 Score -   How difficult have these problems made it for you to do your work, take care of your home and get along with others -       Learning Assessment:  Learning Assessment 7/15/2019   PRIMARY LEARNER Patient   HIGHEST LEVEL OF EDUCATION - PRIMARY LEARNER  > 4 YEARS OF COLLEGE   BARRIERS PRIMARY LEARNER NONE   CO-LEARNER CAREGIVER No   PRIMARY LANGUAGE ENGLISH    NEED No   LEARNER PREFERENCE PRIMARY DEMONSTRATION   ANSWERED BY patient   RELATIONSHIP SELF       Travel Screening:    Travel Screening     Question   Response    In the last month, have you been in contact with someone who was confirmed or suspected to have Coronavirus / COVID-19? No / Unsure    Have you had a COVID-19 viral test in the last 14 days? Yes - Negative result    Do you have any of the following new or worsening symptoms? None of these    Have you traveled internationally or domestically in the last month?   Yes (PT traveled to [place] and has had a negative COVID test before returning)      Travel History   Travel since 06/26/21     No documented travel since 06/26/21          Health Maintenance reviewed and discussed and ordered per Provider. Health Maintenance Due   Topic Date Due    PAP AKA CERVICAL CYTOLOGY  09/29/2017    Shingrix Vaccine Age 50> (1 of 2) Never done    Breast Cancer Screen Mammogram  06/14/2018   . Coordination of Care:  1. Have you been to the ER, urgent care clinic since your last visit? Hospitalized since your last visit? no    2. Have you seen or consulted any other health care providers outside of the [address] [place] [address] since your last visit? Include any pap smears or colon screening. No    Immunization Shingrix  administered 7/26/2021 by Ravin Francis Verified by Holley Singh CMA. Patient tolerated procedure well. No reactions noted.

## 2021-07-27 LAB
A-G RATIO,AGRAT: 1.6 RATIO (ref 1.1–2.6)
ABSOLUTE LYMPHOCYTE COUNT, 10803: 2.2 K/UL (ref 1–4.8)
ALBUMIN SERPL-MCNC: 4.4 G/DL (ref 3.5–5)
ALP SERPL-CCNC: 76 U/L (ref 25–115)
ALT SERPL-CCNC: 13 U/L (ref 5–40)
ANION GAP SERPL CALC-SCNC: 9 MMOL/L (ref 3–15)
AST SERPL W P-5'-P-CCNC: 11 U/L (ref 10–37)
AVG GLU, 10930: 125 MG/DL (ref 91–123)
BASOPHILS # BLD: 0.1 K/UL (ref 0–0.2)
BASOPHILS NFR BLD: 1 % (ref 0–2)
BILIRUB SERPL-MCNC: 0.3 MG/DL (ref 0.2–1.2)
BILIRUB UR QL: NEGATIVE
BUN SERPL-MCNC: 13 MG/DL (ref 6–22)
CALCIUM SERPL-MCNC: 10.2 MG/DL (ref 8.4–10.5)
CHLORIDE SERPL-SCNC: 101 MMOL/L (ref 98–110)
CHOLEST SERPL-MCNC: 266 MG/DL (ref 110–200)
CLARITY: NORMAL
CO2 SERPL-SCNC: 29 MMOL/L (ref 20–32)
COLOR UR: YELLOW
CREAT SERPL-MCNC: 0.7 MG/DL (ref 0.5–1.2)
EOSINOPHIL # BLD: 0.4 K/UL (ref 0–0.5)
EOSINOPHIL NFR BLD: 4 % (ref 0–6)
ERYTHROCYTE [DISTWIDTH] IN BLOOD BY AUTOMATED COUNT: 13.9 % (ref 10–15.5)
GFRAA, 66117: >60
GFRNA, 66118: >60
GLOBULIN,GLOB: 2.7 G/DL (ref 2–4)
GLUCOSE SERPL-MCNC: 101 MG/DL (ref 70–99)
GLUCOSE UR QL: NEGATIVE MG/DL
GRANULOCYTES,GRANS: 63 % (ref 40–75)
HBA1C MFR BLD HPLC: 6 % (ref 4.8–5.6)
HCT VFR BLD AUTO: 47.6 % (ref 35.1–48)
HDLC SERPL-MCNC: 5.2 MG/DL (ref 0–5)
HDLC SERPL-MCNC: 51 MG/DL
HGB BLD-MCNC: 14.2 G/DL (ref 11.7–16)
HGB UR QL STRIP: NEGATIVE
KETONES UR QL STRIP.AUTO: NEGATIVE MG/DL
LDL/HDL RATIO,LDHD: 3.5
LDLC SERPL CALC-MCNC: 179 MG/DL (ref 50–99)
LEUKOCYTE ESTERASE: NEGATIVE
LYMPHOCYTES, LYMLT: 22 % (ref 20–45)
MCH RBC QN AUTO: 30 PG (ref 26–34)
MCHC RBC AUTO-ENTMCNC: 30 G/DL (ref 31–36)
MCV RBC AUTO: 101 FL (ref 81–99)
MONOCYTES # BLD: 1 K/UL (ref 0.1–1)
MONOCYTES NFR BLD: 10 % (ref 3–12)
NEUTROPHILS # BLD AUTO: 6.2 K/UL (ref 1.8–7.7)
NITRITE UR QL STRIP.AUTO: NEGATIVE
NON-HDL CHOLESTEROL, 011976: 215 MG/DL
PH UR STRIP: 6 PH (ref 5–8)
PLATELET # BLD AUTO: 297 K/UL (ref 140–440)
PMV BLD AUTO: 10.7 FL (ref 9–13)
POTASSIUM SERPL-SCNC: 4.4 MMOL/L (ref 3.5–5.5)
PROT SERPL-MCNC: 7.1 G/DL (ref 6.4–8.3)
PROT UR QL STRIP: NEGATIVE MG/DL
RBC # BLD AUTO: 4.72 M/UL (ref 3.8–5.2)
SODIUM SERPL-SCNC: 139 MMOL/L (ref 133–145)
SP GR UR: 1 (ref 1–1.03)
T4 FREE SERPL-MCNC: 1.4 NG/DL (ref 0.9–1.8)
TRIGL SERPL-MCNC: 184 MG/DL (ref 40–149)
TSH SERPL DL<=0.005 MIU/L-ACNC: 0.94 MCU/ML (ref 0.27–4.2)
URINE ASCORBIC ACID: NEGATIVE MG/DL
UROBILINOGEN UR STRIP-MCNC: <2 MG/DL
VLDLC SERPL CALC-MCNC: 37 MG/DL (ref 8–30)
WBC # BLD AUTO: 9.8 K/UL (ref 4–11)

## 2021-07-28 DIAGNOSIS — R73.03 PREDIABETES: ICD-10-CM

## 2021-07-28 DIAGNOSIS — E78.2 MIXED HYPERLIPIDEMIA: Primary | ICD-10-CM

## 2021-07-28 RX ORDER — ATORVASTATIN CALCIUM 40 MG/1
40 TABLET, FILM COATED ORAL DAILY
Qty: 90 TABLET | Refills: 3 | Status: SHIPPED | OUTPATIENT
Start: 2021-07-28 | End: 2022-09-22

## 2021-07-28 NOTE — PROGRESS NOTES
Please advise that lab results including thyroid levels are satisfactory on current medications except for a hemoglobin A1c of 6.0% up from 5.5% last year. This measures the average daily blood sugar over the past 3 months and is consistent with prediabetes. Treatment with medication is not needed however it is important to decrease dietary starch and sugar and follow program of regular aerobic exercise. Cholesterol levels have increased significantly and treatment with medication for this is recommended. Please asked her to start taking atorvastatin 40 mg daily. A prescription has been sent to her pharmacy. Please assist her with scheduling a lab appointment for 2 months after starting the medication.

## 2021-12-01 ENCOUNTER — CLINICAL SUPPORT (OUTPATIENT)
Dept: FAMILY MEDICINE CLINIC | Age: 51
End: 2021-12-01
Payer: COMMERCIAL

## 2021-12-01 DIAGNOSIS — Z23 ENCOUNTER FOR IMMUNIZATION: Primary | ICD-10-CM

## 2021-12-01 PROCEDURE — 90750 HZV VACC RECOMBINANT IM: CPT | Performed by: FAMILY MEDICINE

## 2021-12-01 NOTE — PROGRESS NOTES
Shingrix # 2 Immunization/s administered 12/1/2021 by Danya Hobson LPN with patients  consent. Patient tolerated procedure well. No reactions noted.

## 2021-12-04 DIAGNOSIS — E03.9 ACQUIRED HYPOTHYROIDISM: ICD-10-CM

## 2021-12-04 RX ORDER — LEVOTHYROXINE SODIUM 125 UG/1
TABLET ORAL
Qty: 90 TABLET | Refills: 2 | Status: SHIPPED | OUTPATIENT
Start: 2021-12-04

## 2022-02-16 DIAGNOSIS — F51.04 CHRONIC INSOMNIA: ICD-10-CM

## 2022-02-17 RX ORDER — ZOLPIDEM TARTRATE 5 MG/1
TABLET ORAL
Qty: 90 TABLET | Refills: 1 | Status: SHIPPED | OUTPATIENT
Start: 2022-02-17 | End: 2022-09-22 | Stop reason: SDUPTHER

## 2022-03-19 PROBLEM — E78.2 MIXED HYPERLIPIDEMIA: Status: ACTIVE | Noted: 2019-07-15

## 2022-03-19 PROBLEM — R73.03 PREDIABETES: Status: ACTIVE | Noted: 2021-07-28

## 2022-03-19 PROBLEM — N95.1 PERIMENOPAUSAL: Status: ACTIVE | Noted: 2017-12-07

## 2022-03-20 PROBLEM — D12.6 TUBULAR ADENOMA OF COLON: Status: ACTIVE | Noted: 2019-08-21

## 2022-04-07 LAB — SARS-COV-2, NAA: NOT DETECTED

## 2022-08-26 DIAGNOSIS — Z00.00 ROUTINE HEALTH MAINTENANCE: Primary | ICD-10-CM

## 2022-08-26 DIAGNOSIS — E03.9 ACQUIRED HYPOTHYROIDISM: ICD-10-CM

## 2022-08-26 DIAGNOSIS — R73.03 PREDIABETES: ICD-10-CM

## 2022-08-26 DIAGNOSIS — E78.2 MIXED HYPERLIPIDEMIA: ICD-10-CM

## 2022-09-11 DIAGNOSIS — F51.04 CHRONIC INSOMNIA: ICD-10-CM

## 2022-09-11 RX ORDER — ZOLPIDEM TARTRATE 5 MG/1
TABLET ORAL
Qty: 90 TABLET | OUTPATIENT
Start: 2022-09-11

## 2022-09-16 NOTE — TELEPHONE ENCOUNTER
Patient has been scheduled   Future Appointments   Date Time Provider Hailey Miguel   9/22/2022  1:30 PM Peg Centeno MD [facility]

## 2022-09-22 ENCOUNTER — OFFICE VISIT (OUTPATIENT)
Dept: FAMILY MEDICINE CLINIC | Age: 52
End: 2022-09-22
Payer: COMMERCIAL

## 2022-09-22 ENCOUNTER — APPOINTMENT (OUTPATIENT)
Dept: FAMILY MEDICINE CLINIC | Age: 52
End: 2022-09-22

## 2022-09-22 VITALS
RESPIRATION RATE: 16 BRPM | TEMPERATURE: 98.4 F | HEIGHT: 67 IN | OXYGEN SATURATION: 96 % | BODY MASS INDEX: 32.33 KG/M2 | HEART RATE: 82 BPM | WEIGHT: 206 LBS | SYSTOLIC BLOOD PRESSURE: 132 MMHG | DIASTOLIC BLOOD PRESSURE: 70 MMHG

## 2022-09-22 DIAGNOSIS — F51.04 CHRONIC INSOMNIA: ICD-10-CM

## 2022-09-22 DIAGNOSIS — Z00.00 ROUTINE HEALTH MAINTENANCE: ICD-10-CM

## 2022-09-22 DIAGNOSIS — E03.9 ACQUIRED HYPOTHYROIDISM: ICD-10-CM

## 2022-09-22 DIAGNOSIS — F32.0 CURRENT MILD EPISODE OF MAJOR DEPRESSIVE DISORDER WITHOUT PRIOR EPISODE (HCC): ICD-10-CM

## 2022-09-22 DIAGNOSIS — R73.03 PREDIABETES: ICD-10-CM

## 2022-09-22 DIAGNOSIS — E78.2 MIXED HYPERLIPIDEMIA: ICD-10-CM

## 2022-09-22 DIAGNOSIS — Z12.31 BREAST CANCER SCREENING BY MAMMOGRAM: ICD-10-CM

## 2022-09-22 DIAGNOSIS — Z00.00 ROUTINE GENERAL MEDICAL EXAMINATION AT A HEALTH CARE FACILITY: Primary | ICD-10-CM

## 2022-09-22 DIAGNOSIS — M75.42 IMPINGEMENT SYNDROME OF LEFT SHOULDER: ICD-10-CM

## 2022-09-22 DIAGNOSIS — Z86.010 HISTORY OF ADENOMATOUS POLYP OF COLON: ICD-10-CM

## 2022-09-22 PROCEDURE — 99396 PREV VISIT EST AGE 40-64: CPT | Performed by: FAMILY MEDICINE

## 2022-09-22 RX ORDER — ZOLPIDEM TARTRATE 5 MG/1
TABLET ORAL
Qty: 90 TABLET | Refills: 1 | Status: SHIPPED | OUTPATIENT
Start: 2022-09-22

## 2022-09-22 NOTE — PROGRESS NOTES
HISTORY OF PRESENT ILLNESS  Panda Verde is a 45 y.o. female. She presents for health assessment, preventative care and follow-up with a history of acquired hypothyroidism, hyperlipidemia, depression, prediabetes, chronic insomnia and tubular adenoma of the colon    Mr#: [id number]      Past Medical History:   Diagnosis Date    Depression 10/17/2011    Hypothyroid 10/17/2011       Past Surgical History:   Procedure Laterality Date    HX  SECTION      times 2    HX COLONOSCOPY  2019    Single polyp from the transverse colon, tubular adenoma, 5-year follow-up       Family History   Problem Relation Age of Onset    Heart Disease Mother     Hypertension Mother        No Known Allergies    Social History     Tobacco Use   Smoking Status Never   Smokeless Tobacco Never       Social History     Substance and Sexual Activity   Alcohol Use Yes         Patient Active Problem List   Diagnosis Code    Hypothyroid E03.9    Genital herpes A60.00    Perimenopausal N95.1    Mixed hyperlipidemia E78.2    Tubular adenoma of colon D12.6    Prediabetes R73.03    Depression F32. A         Current Outpatient Medications:     zolpidem (AMBIEN) 5 mg tablet, take 1 tablet by mouth at bedtime if needed for sleep, Disp: 90 Tablet, Rfl: 1    sertraline (ZOLOFT) 100 mg tablet, take 1 tablet by mouth nightly, Disp: 90 Tablet, Rfl: 0    levothyroxine (SYNTHROID) 125 mcg tablet, take 1 tablet by mouth once daily before breakfast, Disp: 90 Tablet, Rfl: 2       Review of Systems   Constitutional:  Negative for chills, fever and weight loss. HENT:  Negative for congestion, ear pain, hearing loss and sore throat. Eyes:  Negative for blurred vision and double vision. Respiratory:  Negative for cough, shortness of breath and wheezing. Cardiovascular:  Negative for chest pain, palpitations and leg swelling.    Gastrointestinal:  Negative for abdominal pain, blood in stool, constipation, diarrhea, heartburn, melena, nausea and vomiting. Genitourinary:  Negative for dysuria and urgency. Musculoskeletal:  Positive for joint pain (left shoulder with decreased ROM about a month getting worse). Negative for myalgias. Skin:  Negative for itching and rash. Neurological:  Negative for dizziness, tingling, sensory change, focal weakness and headaches. Endo/Heme/Allergies:  Positive for environmental allergies. Psychiatric/Behavioral:  Positive for depression. The patient has insomnia. The patient is not nervous/anxious. Visit Vitals  /70   Pulse 82   Temp 98.4 °F (36.9 °C) (Temporal)   Resp 16   Ht 5' 7\" (1.702 m)   Wt 206 lb (93.4 kg)   SpO2 96%   BMI 32.26 kg/m²       Physical Exam  Vitals and nursing note reviewed. Constitutional:       General: She is not in acute distress. Appearance: Normal appearance. She is obese. She is not ill-appearing. HENT:      Head: Normocephalic. Right Ear: Tympanic membrane, ear canal and external ear normal.      Left Ear: Tympanic membrane, ear canal and external ear normal.   Eyes:      Extraocular Movements: Extraocular movements intact. Conjunctiva/sclera: Conjunctivae normal.      Pupils: Pupils are equal, round, and reactive to light. Neck:      Vascular: No carotid bruit. Cardiovascular:      Rate and Rhythm: Normal rate and regular rhythm. Heart sounds: Normal heart sounds. Pulmonary:      Effort: Pulmonary effort is normal.      Breath sounds: Normal breath sounds. Abdominal:      Palpations: Abdomen is soft. Tenderness: There is no abdominal tenderness. Musculoskeletal:         General: No deformity. Cervical back: Neck supple. Right lower leg: No edema. Left lower leg: No edema. Comments: Left shoulder with distal lateral tenderness with f markedly reduced range of motion with pain on abduction, internal and external rotation.  Rotator cuff muscle group strength  against resistance difficult to assess secondary to pain. Skin:     General: Skin is warm and dry. Neurological:      Mental Status: She is alert and oriented to person, place, and time. Psychiatric:         Mood and Affect: Mood normal.         Behavior: Behavior normal.       ASSESSMENT and PLAN    ICD-10-CM ICD-9-CM    1. Routine general medical examination at a health care facility  Z00.00 V70.0       2. Acquired hypothyroidism  E03.9 244.9       3. Mixed hyperlipidemia  E78.2 272.2       4. Current mild episode of major depressive disorder without prior episode (HCC)  F32.0 296.21       5. Prediabetes  R73.03 790.29       6. Chronic insomnia  F51.04 780.52 zolpidem (AMBIEN) 5 mg tablet      7. History of adenomatous polyp of colon  Z86.010 V12.72       8.  Impingement syndrome of left shoulder  M75.42 726.2 REFERRAL TO PHYSICAL THERAPY      9. Breast cancer screening by mammogram  Z12.31 V76.12 ALICIA MAMMO BI SCREENING INCL CAD      Assessment:  Shoulder pain, left shoulder with gradual onset, decreased range of motion consistent with impingement  Status of hyperlipidemia, hypothyroidism and prediabetes to be determined pending lab results  Symptoms of depression  Symptoms of insomnia exacerbated by shoulder pain and stress  History of colonic tubular adenoma with colonoscopy surveillance up-to-date    Health maintenance recommendations:  Immunization with COVID-19 bivalent vaccine  Influenza immunization declined  Mammogram ordered    Plan:  Physical therapy referral-Home exercises provided in the interim  Follow-up for new symptoms, worsening symptoms or failure to improve  Obtain previously ordered lab studies  Continue current medications pending lab results  Avoid dietary starch and sugar and follow program of regular aerobic exercise  Return for annual physical exam and follow-up in 1 year or sooner with any problems  Please always arrive at least 15 minutes before your scheduled appointment time  Paulette Jaeger MD      PLEASE NOTE:   This document has been produced using voice recognition software. Unrecognized errors in transcription may be present.

## 2022-09-22 NOTE — PROGRESS NOTES
Yu Morrow is a 45 y.o. female (: 1970) presenting to address:    Chief Complaint   Patient presents with    Shoulder Pain    Thyroid Problem     Declined flu shot . Vitals:    22 1316   BP: 132/70   Pulse: 82   Resp: 16   Temp: 98.4 °F (36.9 °C)   TempSrc: Temporal   SpO2: 96%   Weight: 206 lb (93.4 kg)   Height: 5' 7\" (1.702 m)   PainSc:   3   PainLoc: Shoulder       Hearing/Vision:   No results found. Learning Assessment:     Learning Assessment 7/15/2019   PRIMARY LEARNER Patient   HIGHEST LEVEL OF EDUCATION - PRIMARY LEARNER  > 4 YEARS OF COLLEGE   BARRIERS PRIMARY LEARNER NONE   CO-LEARNER CAREGIVER No   PRIMARY LANGUAGE ENGLISH    NEED No   LEARNER PREFERENCE PRIMARY DEMONSTRATION   ANSWERED BY patient   RELATIONSHIP SELF     Depression Screening:     3 most recent PHQ Screens 2021   PHQ Not Done -   Little interest or pleasure in doing things Not at all   Feeling down, depressed, irritable, or hopeless Not at all   Total Score PHQ 2 0   Trouble falling or staying asleep, or sleeping too much Nearly every day   Feeling tired or having little energy Several days   Poor appetite, weight loss, or overeating Not at all   Feeling bad about yourself - or that you are a failure or have let yourself or your family down Not at all   Trouble concentrating on things such as school, work, reading, or watching TV Not at all   Moving or speaking so slowly that other people could have noticed; or the opposite being so fidgety that others notice Not at all   Thoughts of being better off dead, or hurting yourself in some way Not at all   PHQ 9 Score 4   How difficult have these problems made it for you to do your work, take care of your home and get along with others Not difficult at all     Fall Risk Assessment:     Fall Risk Assessment, last 12 mths 7/15/2019   Able to walk? Yes   Fall in past 12 months?  No     Abuse Screening:     Abuse Screening Questionnaire 2021   Do you ever feel afraid of your partner? N   Are you in a relationship with someone who physically or mentally threatens you? N   Is it safe for you to go home? Y     ADL Assessment:     ADL Assessment 12/12/2018   Feeding yourself No Help Needed   Getting from bed to chair No Help Needed   Getting dressed No Help Needed   Bathing or showering No Help Needed   Walk across the room (includes cane/walker) No Help Needed   Using the telphone No Help Needed   Taking your medications No Help Needed   Preparing meals No Help Needed   Managing money (expenses/bills) No Help Needed   Moderately strenuous housework (laundry) No Help Needed   Shopping for personal items (toiletries/medicines) No Help Needed   Shopping for groceries No Help Needed   Driving No Help Needed   Climbing a flight of stairs No Help Needed   Getting to places beyond walking distances No Help Needed        Coordination of Care Questionaire:   1. \"Have you been to the ER, urgent care clinic since your last visit? Hospitalized since your last visit? \" No    2. \"Have you seen or consulted any other health care providers outside of the [address] [place] [address] [place] since your last visit? \" Gyn      3. For patients aged 39-70: Has the patient had a colonoscopy? Yes - no Care Gap present     If the patient is female:    4. For patients aged 41-77: Has the patient had a mammogram within the past 2 years? Yes - Care Gap present. Rooming MA/LPN to request most recent results    5. For patients aged 21-65: Has the patient had a pap smear? Yes - no Care Gap present    Advanced Directive:   1. Do you have an Advanced Directive? No     2. Would you like information on Advanced Directives?  NO

## 2022-09-22 NOTE — PATIENT INSTRUCTIONS
Assessment:  Shoulder pain, left shoulder with gradual onset, decreased range of motion consistent with impingement  Status of hyperlipidemia, hypothyroidism and prediabetes to be determined pending lab results  Symptoms of depression  Symptoms of insomnia exacerbated by shoulder pain and stress  History of colonic tubular adenoma with colonoscopy surveillance up-to-date    Health maintenance recommendations:  Immunization with COVID-19 bivalent vaccine  Influenza immunization declined  Mammogram ordered    Plan:  Physical therapy referral-Home exercises provided in the interim  Follow-up for new symptoms, worsening symptoms or failure to improve  Obtain previously ordered lab studies  Continue current medications pending lab results  Avoid dietary starch and sugar and follow program of regular aerobic exercise  Return for annual physical exam and follow-up in 1 year or sooner with any problems  Please always arrive at least 15 minutes before your scheduled appointment time

## 2022-09-23 LAB
ABSOLUTE LYMPHOCYTE COUNT, 10803: 2.5 K/UL (ref 1–4.8)
AVG GLU, 10930: 134 MG/DL (ref 91–123)
BACTERIA,BACTU: NEGATIVE
BASOPHILS # BLD: 0.1 K/UL (ref 0–0.2)
BASOPHILS NFR BLD: 1 % (ref 0–2)
BILIRUB UR QL: NEGATIVE
CHOLEST SERPL-MCNC: 227 MG/DL (ref 110–200)
CLARITY: CLEAR
COLOR UR: YELLOW
EOSINOPHIL # BLD: 0.4 K/UL (ref 0–0.5)
EOSINOPHIL NFR BLD: 5 % (ref 0–6)
EPITHELIAL,EPSU: NORMAL /HPF (ref 0–2)
ERYTHROCYTE [DISTWIDTH] IN BLOOD BY AUTOMATED COUNT: 13.6 % (ref 10–15.5)
GLUCOSE UR QL: NEGATIVE MG/DL
GRANULOCYTES,GRANS: 51 % (ref 40–75)
HBA1C MFR BLD HPLC: 6.3 % (ref 4.8–5.6)
HCT VFR BLD AUTO: 41.5 % (ref 35.1–48)
HDLC SERPL-MCNC: 4.3 MG/DL (ref 0–5)
HDLC SERPL-MCNC: 53 MG/DL
HGB BLD-MCNC: 13.5 G/DL (ref 11.7–16)
HGB UR QL STRIP: NEGATIVE
HYLINE CAST, 6014: NORMAL /LPF (ref 0–2)
KETONES UR QL STRIP.AUTO: NEGATIVE MG/DL
LDL/HDL RATIO,LDHD: 2.5
LDLC SERPL CALC-MCNC: 134 MG/DL (ref 50–99)
LEUKOCYTE ESTERASE: NEGATIVE
LYMPHOCYTES, LYMLT: 34 % (ref 20–45)
MCH RBC QN AUTO: 32 PG (ref 26–34)
MCHC RBC AUTO-ENTMCNC: 33 G/DL (ref 31–36)
MCV RBC AUTO: 97 FL (ref 80–99)
MONOCYTES # BLD: 0.7 K/UL (ref 0.1–1)
MONOCYTES NFR BLD: 9 % (ref 3–12)
NEUTROPHILS # BLD AUTO: 3.8 K/UL (ref 1.8–7.7)
NITRITE UR QL STRIP.AUTO: NEGATIVE
NON-HDL CHOLESTEROL, 011976: 174 MG/DL
PH UR STRIP: 6.5 PH (ref 5–8)
PLATELET # BLD AUTO: 290 K/UL (ref 140–440)
PMV BLD AUTO: 10.6 FL (ref 9–13)
PROT UR QL STRIP: NEGATIVE MG/DL
RBC # BLD AUTO: 4.29 M/UL (ref 3.8–5.2)
RBC #/AREA URNS HPF: NORMAL /HPF
SP GR UR: 1.02 (ref 1–1.03)
T4 FREE SERPL-MCNC: 0.9 NG/DL (ref 0.9–1.8)
TRIGL SERPL-MCNC: 201 MG/DL (ref 40–149)
TSH SERPL DL<=0.005 MIU/L-ACNC: 5.05 MCU/ML (ref 0.27–4.2)
UROBILINOGEN UR STRIP-MCNC: 0.2 MG/DL
VLDLC SERPL CALC-MCNC: 40 MG/DL (ref 8–30)
WBC # BLD AUTO: 7.4 K/UL (ref 4–11)
WBC URNS QL MICRO: NORMAL /HPF (ref 0–5)

## 2022-09-24 DIAGNOSIS — R73.03 PREDIABETES: Primary | ICD-10-CM

## 2022-09-24 DIAGNOSIS — E03.9 ACQUIRED HYPOTHYROIDISM: ICD-10-CM

## 2022-09-24 NOTE — PROGRESS NOTES
Please advise Ms. Alamo that her lab results show improvement in her cholesterol levels. The hemoglobin A1c which measures the average glucose level over the past 3 months has increased from 6.0 to 6.3%. A diagnosis of type 2 diabetes starts at 6.5%. The TSH or thyroid-stimulating hormone is very slightly elevated and the free T4 or thyroxine level is at the lower limits of normal.  I do not think there is any need for changing or adding medication at this time but would request that she schedule back for a lab appointment followed by an office appointment in 6 months to recheck these levels. It would be helpful to avoid dietary starch and sugar and is much as possible follow program of regular aerobic exercise. Lab orders have been entered.

## 2022-10-06 ENCOUNTER — HOSPITAL ENCOUNTER (OUTPATIENT)
Dept: PHYSICAL THERAPY | Age: 52
Discharge: HOME | End: 2022-10-06
Payer: COMMERCIAL

## 2022-10-06 PROCEDURE — 97161 PT EVAL LOW COMPLEX 20 MIN: CPT

## 2022-10-06 NOTE — PROGRESS NOTES
PHYSICAL THERAPY - DAILY TREATMENT NOTE    Patient Name: Tristan Ruiz        Date: 10/6/2022  : 1970   YES Patient  Verified  Visit #:   1   of   6  Insurance: Payor: Aissatou Heredia / [place]: [place] / Product Type: PPO /      In time: 5:20 Out time: 6:15   Total Treatment Time: 55     Medicare/[facility] Time Tracking (below)   Total Timed Codes (min):  NB 1:1 Treatment Time:  55     TREATMENT AREA =  Left shoulder pain [M25.512]    SUBJECTIVE    Pain Level (on 0 to 10 scale):  2  / 10   Medication Changes/New allergies or changes in medical history, any new surgeries or procedures? NO    If yes, update Summary List   Subjective Functional Status/Changes:  []  No changes reported     See POC          OBJECTIVE  NB min Manual Therapy: GH mobs/clocks, PROM for ABD and IR/ER in supine   Rationale:      decrease pain, increase ROM, and increase tissue extensibility to improve patient's ability to improve pt buy in to plan and improve QOL  The manual therapy interventions were performed at a separate and distinct time from the therapeutic activities interventions. NB min Therapeutic Exercise:  [x]  See flow sheet   Rationale:      increase ROM to improve the patients ability to complete HEP     NB min Self Care: Reviewed diagnosis, prognosis, therapy progression   Rationale:    Improve understanding of injury and therapy to have realistic expectation of therapy to improve compliance/adherence and satisfaction    Billed With/As:   [] TE   [] TA   [] Neuro   [x] Self Care Patient Education: [x] Review HEP    [] Progressed/Changed HEP based on:   [x] Addressed barriers and behaviors     [] Therapeutic Neuroscience Pain Education, metaphor, reframing, contexts.     [x] Sleep Education   [] Body Mechanics [x] Healing Timeframe     [] Self STM with ball at \"the spot\"  [x] Walking Program/Global Activity   [] other:         Other Objective/Functional Measures:    See POC note     Post Treatment Pain Level (on 0 to 10) scale:   2 / 10     ASSESSMENT  Assessment/Changes in Function:     See POC     []  See Progress Note/Recertification   Patient will continue to benefit from skilled PT services to modify and progress therapeutic interventions, address functional mobility deficits, address ROM deficits, address strength deficits, analyze and address soft tissue restrictions, analyze and cue movement patterns, analyze and modify body mechanics/ergonomics and assess and modify postural abnormalities to attain goals per POC. Progress toward goals / Updated goals:    Pt evaluated today.  See POC     PLAN  [x]  Upgrade activities as tolerated YES Continue plan of care   []  Discharge due to :    []  Other:      Therapist: Cherie Florez, PT, DPT, OCS    Date: 10/6/2022 Time: 5:24 PM

## 2022-10-06 NOTE — THERAPY EVALUATION
[address],[place], [facility] - Phone: [phone]  Fax: [phone] / [address] [facility]  Patient Name: Anay Bueno : 1970   Medical   Diagnosis: Left shoulder pain [M25.512] Treatment Diagnosis: Left shoulder pain [M25.512]   Onset Date: ~1 yr ago, worsening last few months     Referral Source: Andrae Ventura MD [facility] of [facility]): 10/6/2022   Prior Hospitalization: See medical history Provider #: [id number]   Prior Level of Function: Could move shoulder w/o complication   Comorbidities: Menopause, Pre-Diabetes, >BMI, Depression, Allergies, thyroid issues   Medications: Verified on Patient Summary List   The Plan of Care and following information is based on the information from the initial evaluation.   ===========================================================================================  Subjective: Reports her body has been hurting and having problems since menopause. She reports the left shoulder hurts, has less motion, aches down the arm and has to prop it up and can't lean on it. Saw her PCP and sent to PT. Denies injury. Assessment / key information:  Pt presents to [facility] at [facility], [facility]. with signs and symptoms congruent with a diagnosis of LEFT Adhesive Capsulitis with appropriate pt Hx/Comorbidities and loss of rotation and ABD ROM. This affects her QOL and ability to reach the arm for her work as a . Patient would benefit from skilled intervention to address the above deficits, improve quality of life and return patient to maximum level of prior function.     OBJECTIVE:   Posture/Positioning: WNL/variable  Cervical Screen: WNL, non-provacative    Standing Shoulder AROM:  Flexion: L: 90 deg,  R: 180 deg  ABD: L: 65 deg,  R: 180 deg  Functional ER: L: ipsilater neck,  R: T3  Functional IR: L: Ipsilateral glute,  R: T9    Shoulder PROM same as AROM. Left Shoulder ER at side: 5 deg    GH mobility: hypomobile and reported \"ache\"     HEP:  Access Code: [email or web address]  URL: [email or web address]  Date: 10/06/2022  Prepared by: Kay Isfreya    Exercises  Seated Shoulder Flexion Towel Slide at Table Top - 2 x daily - 7 x weekly - 1 sets - 10 reps - 5 hold  Seated Shoulder Scaption Slide at Table Top with Forearm in Neutral - 2 x daily - 7 x weekly - 1 sets - 10 reps - 5 hold    ===========================================================================================  Eval Complexity: History HIGH Complexity :3+ comorbidities / personal factors will impact the outcome/ POC ;  Examination  MEDIUM Complexity : 3 Standardized tests and measures addressing body structure, function, activity limitation and / or participation in recreation ; Presentation LOW Complexity : Stable, uncomplicated ;  Decision Making MEDIUM Complexity : FOTO score of 26-74; Overall Complexity MEDIUM  Problem List: pain affecting function, decrease ROM, decrease ADL/ functional abilitiies, and decrease flexibility/ joint mobility   Treatment Plan may include any combination of the following: Therapeutic exercise, Therapeutic activities, Neuromuscular re-education, Physical agent/modality, Manual therapy, Patient education, and Self Care training  Patient / Family readiness to learn indicated by: asking questions, trying to perform skills, and interest  Persons(s) to be included in education: patient (P)  Barriers to Learning/Limitations: None  Measures taken, if barriers to learning:    Patient Goal (s): Reduce pain   Patient self reported health status: good  Rehabilitation Potential: good  Short Term Goals:  To be accomplished in  3  weeks  STG1 Pt to report adherence and demonstrate compliance with basic HEP to allow progress between visits  STG2 Pt to report pain <3/10 at worst to allow improved function and QOL  STG3 Pt to report non-disturbed sleep of 7hrs to allow improved healing and QOL  Long Term Goals: To be accomplished in  6  weeks  LTG1 Pt to improve FOTO score to 69/100 indicating improved function in daily tasks  LTG2 Pt to improve AROM of the LEFT shoulder:   Flexion 120 deg,   ABD 90 deg,   Functional ER to C7,   Functional IR to L1 to improve QOL  LTG3 Pt to indicate a Global Rating Of Change (GROC) of 4+ indicating \"moderately better\"    Frequency / Duration:   Patient to be seen  1  times per week for 6  weeks  Patient / Caregiver education and instruction: self care and exercises  Therapist Signature: Brian Liu, PT Date: 37/4/7797   Certification Period: - Time: 5:25 PM   ===========================================================================================  I certify that the above Physical Therapy Services are being furnished while the patient is under my care. I agree with the treatment plan and certify that this therapy is necessary. Physician Signature:        Date:       Time:                                        Nba Moore MD  Please sign and return to [facility] at [facility], [facility]. or you may fax the signed copy to [phone]. Thank you.

## 2022-10-14 ENCOUNTER — HOSPITAL ENCOUNTER (OUTPATIENT)
Dept: PHYSICAL THERAPY | Age: 52
Discharge: HOME | End: 2022-10-14
Payer: COMMERCIAL

## 2022-10-14 PROCEDURE — 97140 MANUAL THERAPY 1/> REGIONS: CPT

## 2022-10-14 PROCEDURE — 97110 THERAPEUTIC EXERCISES: CPT

## 2022-10-14 NOTE — PROGRESS NOTES
PHYSICAL THERAPY - DAILY TREATMENT NOTE    Patient Name: Carol Aldana        Date: 10/14/2022  : 1970   YES Patient  Verified  Visit #:   2  of   6  Insurance: Payor: Ursula Cranker / [place]: [place] / Product Type: PPO /      In time: 8:20 Out time: 9:00   Total Treatment Time: 40       TREATMENT AREA =  Left shoulder pain [M25.512]    SUBJECTIVE  Pain Level (on 0 to 10 scale):  0  / 10   Medication Changes/New allergies or changes in medical history, any new surgeries or procedures? NO If yes, update Summary List   Subjective Functional Status/Changes:  []  No changes reported     Reports it feels looser. Did the HEP. Sleep is sort of OK. OBJECTIVE    15 min Therapeutic Exercise:  [x]  See flow sheet   Rationale:      increase ROM to improve the patients ability to move the arm     25 min Manual Therapy: Supine: PROM ABd and rotation with MHP at shoulder   Rationale:      decrease pain, increase ROM, and increase tissue extensibility to improve patient's ability to move the shoulder  The manual therapy interventions were performed at a separate and distinct time from the therapeutic activities interventions. Billed With/As:   [] TE   [] TA   [] Neuro   [] Self Care Patient Education: [x] Review HEP    [] Progressed/Changed HEP based on:   [] Addressed barriers and behaviors     [] Therapeutic Neuroscience Pain Education, metaphor, reframing, contexts. [] Sleep Education   [] Body Mechanics [] Healing Timeframe     [] Self STM with ball at \"the spot\"  [] Global Activity   [] other:      Other Objective/Functional Measures:    See Flowsheet for drills, loads and volume. AAROM Left flexion: 95 deg (was 90 deg at EVAL)     Post Treatment Pain Level (on 0 to 10) scale:   1  / 10     ASSESSMENT  Assessment/Changes in Function:     Started session with PROM and MHP to shoulder. Followed by active drills.   Pt required skilled instruction for initiation, form and follow-through for all drills in session. Pt responds well to instruction and demo. []  See Progress Note/Recertification   Patient will continue to benefit from skilled PT services to modify and progress therapeutic interventions, address functional mobility deficits, address ROM deficits, address strength deficits, analyze and address soft tissue restrictions, analyze and cue movement patterns, analyze and modify body mechanics/ergonomics, and assess and modify postural abnormalities to attain remaining goals.    Progress toward goals / Updated goals:    Progressing in ROM     PLAN  [x]  Upgrade activities as tolerated YES Continue plan of care   []  Discharge due to :    []  Other:      Therapist: Otilia Florez, PT DPT OCS    Date: 10/14/2022 Time: 8:20 AM     Future Appointments   Date Time Provider Hailey Miguel   10/20/2022  1:35 PM Miles Martinez, PT [facility]   10/26/2022 10:25 AM Dancigers, Chevis Hodgkin, PT [id number]

## 2022-10-20 ENCOUNTER — HOSPITAL ENCOUNTER (OUTPATIENT)
Dept: PHYSICAL THERAPY | Age: 52
Discharge: HOME | End: 2022-10-20
Payer: COMMERCIAL

## 2022-10-20 PROCEDURE — 97110 THERAPEUTIC EXERCISES: CPT

## 2022-10-20 PROCEDURE — 97140 MANUAL THERAPY 1/> REGIONS: CPT

## 2022-10-20 NOTE — PROGRESS NOTES
PHYSICAL THERAPY - DAILY TREATMENT NOTE    Patient Name: Patricia Castro        Date: 10/20/2022  : 1970   YES Patient  Verified  Visit #:   3  of   6  Insurance: Payor: Pawel Pinch / [place]: [place]PO / Product Type: PPO /      In time: 1:45 Out time: 2:25   Total Treatment Time: 40       TREATMENT AREA =  Left shoulder pain [M25.512]    SUBJECTIVE  Pain Level (on 0 to 10 scale):  4  / 10   Medication Changes/New allergies or changes in medical history, any new surgeries or procedures? NO If yes, update Summary List   Subjective Functional Status/Changes:  []  No changes reported     \"One of those days\" Says tired and achy. OBJECTIVE    15 min Therapeutic Exercise:  [x]  See flow sheet   Rationale:      increase ROM to improve the patients ability to move the arm     25 min Manual Therapy: Supine: PROM ABd and rotation with MHP at shoulder   Rationale:      decrease pain, increase ROM, and increase tissue extensibility to improve patient's ability to move the shoulder  The manual therapy interventions were performed at a separate and distinct time from the therapeutic activities interventions. Billed With/As:   [] TE   [] TA   [] Neuro   [] Self Care Patient Education: [x] Review HEP    [] Progressed/Changed HEP based on:   [] Addressed barriers and behaviors     [] Therapeutic Neuroscience Pain Education, metaphor, reframing, contexts. [] Sleep Education   [] Body Mechanics [] Healing Timeframe     [] Self STM with ball at \"the spot\"  [] Global Activity   [] other:      Other Objective/Functional Measures:    See Flowsheet for drills, loads and volume. AAROM Left flexion: 95 deg (was 90 deg at EVAL)     Post Treatment Pain Level (on 0 to 10) scale:   1  / 10     ASSESSMENT  Assessment/Changes in Function:     Started session with PROM and MHP to shoulder. Followed by active drills. Pt reporting ach throughout.       []  See Progress Note/Recertification   Patient will continue to benefit from skilled PT services to modify and progress therapeutic interventions, address functional mobility deficits, address ROM deficits, address strength deficits, analyze and address soft tissue restrictions, analyze and cue movement patterns, analyze and modify body mechanics/ergonomics, and assess and modify postural abnormalities to attain remaining goals. Progress toward goals / Updated goals:    Short Term Goals: To be accomplished in  3  weeks  STG1 Pt to report adherence and demonstrate compliance with basic HEP to allow progress between visits Compliant to date  STG2 Pt to report pain <3/10 at worst to allow improved function and QOL  STG3 Pt to report non-disturbed sleep of 7hrs to allow improved healing and QOL  Long Term Goals:  To be accomplished in  6  weeks  LTG1 Pt to improve FOTO score to 69/100 indicating improved function in daily tasks  LTG2 Pt to improve AROM of the LEFT shoulder:   Flexion 120 deg,   ABD 90 deg,   Functional ER to C7,   Functional IR to L1 to improve QOL  LTG3 Pt to indicate a Global Rating Of Change (GROC) of 4+ indicating \"moderately better\"     PLAN  [x]  Upgrade activities as tolerated YES Continue plan of care   []  Discharge due to :    []  Other:      Therapist: Gemma Aleman, PT DPT [facility]    Date: 10/20/2022 Time: 8:20 AM     Future Appointments   Date Time Provider Hailey Miguel   10/27/2022 12:15 PM Lorraine Nunes PT [facility]   11/4/2022  8:20 AM Marie Villar, [facility] [id number]   11/7/2022  7:00 AM Marie Lugo, [facility] [id number]

## 2022-10-26 ENCOUNTER — APPOINTMENT (OUTPATIENT)
Dept: PHYSICAL THERAPY | Age: 52
End: 2022-10-26
Payer: COMMERCIAL

## 2022-10-27 ENCOUNTER — HOSPITAL ENCOUNTER (OUTPATIENT)
Dept: PHYSICAL THERAPY | Age: 52
Discharge: HOME | End: 2022-10-27
Payer: COMMERCIAL

## 2022-10-27 PROCEDURE — 97110 THERAPEUTIC EXERCISES: CPT

## 2022-10-27 PROCEDURE — 97140 MANUAL THERAPY 1/> REGIONS: CPT

## 2022-10-27 NOTE — PROGRESS NOTES
PHYSICAL THERAPY - DAILY TREATMENT NOTE    Patient Name: Erik Teague        Date: 10/27/2022  : 1970   YES Patient  Verified  Visit #:   4  of   6  Insurance: Payor: Jessi Cost / Plan: [place] PPO / Product Type: PPO /      In time: 12:15 Out time: 1:00   Total Treatment Time: 45       TREATMENT AREA =  Left shoulder pain [M25.512]    SUBJECTIVE  Pain Level (on 0 to 10 scale): \"Irritated\"  / 10   Medication Changes/New allergies or changes in medical history, any new surgeries or procedures? NO If yes, update Summary List   Subjective Functional Status/Changes:  []  No changes reported     Says feels looser today       OBJECTIVE    20 min Therapeutic Exercise:  [x]  See flow sheet   Rationale:      increase ROM to improve the patients ability to move the arm     25 min Manual Therapy: Supine: PROM ABd and rotation with MHP at shoulder. R SL scapular rotation and mobs and UT STM   Rationale:      decrease pain, increase ROM, and increase tissue extensibility to improve patient's ability to move the shoulder  The manual therapy interventions were performed at a separate and distinct time from the therapeutic activities interventions. Billed With/As:   [] TE   [] TA   [] Neuro   [] Self Care Patient Education: [x] Review HEP    [] Progressed/Changed HEP based on:   [] Addressed barriers and behaviors     [] Therapeutic Neuroscience Pain Education, metaphor, reframing, contexts. [] Sleep Education   [] Body Mechanics [] Healing Timeframe     [] Self STM with ball at \"the spot\"  [] Global Activity   [] other:      Other Objective/Functional Measures:    See Flowsheet for drills, loads and volume. AAROM Left flexion: 95 deg (was 90 deg at EVAL)  ABD AROM 65 deg     Post Treatment Pain Level (on 0 to 10) scale:   1  / 10     ASSESSMENT  Assessment/Changes in Function:     Started session with PROM and MHP to shoulder. Followed by active drills. Pt reporting ach throughout.  Reports more sore after session. Gave GTB for shoulder ext HEP./     []  See Progress Note/Recertification   Patient will continue to benefit from skilled PT services to modify and progress therapeutic interventions, address functional mobility deficits, address ROM deficits, address strength deficits, analyze and address soft tissue restrictions, analyze and cue movement patterns, analyze and modify body mechanics/ergonomics, and assess and modify postural abnormalities to attain remaining goals. Progress toward goals / Updated goals:    Short Term Goals: To be accomplished in  3  weeks  STG1 Pt to report adherence and demonstrate compliance with basic HEP to allow progress between visits Compliant to date  STG2 Pt to report pain <3/10 at worst to allow improved function and QOL  STG3 Pt to report non-disturbed sleep of 7hrs to allow improved healing and QOL  Long Term Goals:  To be accomplished in  6  weeks  LTG1 Pt to improve FOTO score to 69/100 indicating improved function in daily tasks  LTG2 Pt to improve AROM of the LEFT shoulder:   Flexion 120 deg,   ABD 90 deg,   Functional ER to C7,   Functional IR to L1 to improve QOL  LTG3 Pt to indicate a Global Rating Of Change (GROC) of 4+ indicating \"moderately better\"     PLAN  [x]  Upgrade activities as tolerated YES Continue plan of care   []  Discharge due to :    []  Other:      Therapist: Clarisse Laboy PT DPT [facility]    Date: 10/27/2022 Time: 8:20 AM     Future Appointments   Date Time Provider Hailey Miguel   11/4/2022  8:20 AM Cisco Morton, [facility]   11/7/2022  7:00 AM Jt Lugo, PT [id number]

## 2022-11-07 ENCOUNTER — APPOINTMENT (OUTPATIENT)
Dept: PHYSICAL THERAPY | Age: 52
End: 2022-11-07

## 2022-11-11 DIAGNOSIS — E03.9 ACQUIRED HYPOTHYROIDISM: ICD-10-CM

## 2022-11-11 LAB — SARS-COV-2, NAA: NOT DETECTED

## 2022-11-11 RX ORDER — LEVOTHYROXINE SODIUM 125 UG/1
TABLET ORAL
Qty: 90 TABLET | Refills: 2 | Status: SHIPPED | OUTPATIENT
Start: 2022-11-11 | End: 2022-11-21

## 2022-11-21 DIAGNOSIS — E03.9 ACQUIRED HYPOTHYROIDISM: ICD-10-CM

## 2022-11-21 RX ORDER — LEVOTHYROXINE SODIUM 125 UG/1
TABLET ORAL
Qty: 90 TABLET | Refills: 2 | Status: SHIPPED | OUTPATIENT
Start: 2022-11-21

## 2022-11-23 ENCOUNTER — TELEPHONE (OUTPATIENT)
Dept: PHYSICAL THERAPY | Age: 52
End: 2022-11-23

## 2022-11-23 NOTE — TELEPHONE ENCOUNTER
Pt not seen since 10/27/22. Had death in family reported 11/7/22. I LM about no future visits scheduled today, requested she call clinic for status update/schedule.

## 2022-12-07 NOTE — THERAPY DISCHARGE
[address] [facility] PHYSICAL THERAPY  [address] [facility] [address],[facility], [address] [facility] - Phone: [phone]  Fax: [phone]    DISCHARGE NOTE  Patient Name: Gayle Segura : 1970   Treatment/Medical Diagnosis: Left shoulder pain [M25.512]   Referral Source: Shandra Chong MD     Date of Initial Visit: 10/6/22 Attended Visits: 4 Missed Visits: 2       SUMMARY OF TREATMENT  Pt attended only initial evaluation and     3     follow-ups and then did not return. Therefore a formal reassessment of goals was not performed. It is known that pt had a death in the family in early November, I had left message and pt has not returned. RECOMMENDATIONS  Discontinue physical therapy due to patient not returning. If you have any questions/comments please contact us directly at [phone]. Thank you for allowing us to assist in the care of your patient.     Therapist Signature: Gemma Aleman PT Date: 22     Time: 10:00 AM

## 2022-12-13 NOTE — MR AVS SNAPSHOT
"----- Message from Jose Cazares sent at 12/13/2022  3:31 PM CST -----  Scheduling Request                   Patient Status:  active    Scheduling Appt : [facility] mammogram    Time/Date Preference: first available    Tagwhat Active User?: yes    Relationship to Patient?: self    Contact Preference?: [phone]    Treating Provider: Tara    Do you feel you need to be seen today? no    Additional Notes: attempted to schedule pt at several locations near [place] but no appts pulled up in Epic      "Thank you for all that you do for our patients'     " Visit Information Date & Time Provider Department Dept. Phone Encounter #  
 12/7/2017  7:30 AM Janice Ferrer [phone] [id number] Follow-up Instructions Return in about 6 months (around 6/7/2018) for rov, labs at next visit. Follow-up and Disposition History Upcoming Health Maintenance Date Due Influenza Age 5 to Adult 8/1/2017 PAP AKA CERVICAL CYTOLOGY 10/13/2018 DTaP/Tdap/Td series (2 - Td) 10/17/2021 Allergies as of 12/7/2017  Review Complete On: 7/13/2017 By: Alex Popma, DO No Known Allergies Current Immunizations  Reviewed on 10/17/2011 Name Date TDAP Vaccine 10/17/2011 Not reviewed this visit You Were Diagnosed With   
  
 Codes Comments Routine general medical examination at a health care facility    -  Primary ICD-10-CM: Z00.00 ICD-9-CM: V70.0 Sore throat     ICD-10-CM: J02.9 ICD-9-CM: 211 Chest congestion     ICD-10-CM: R09.89 ICD-9-CM: 098. 9 Acquired hypothyroidism     ICD-10-CM: E03.9 ICD-9-CM: 244.9 Mild single current episode of major depressive disorder (HCC)     ICD-10-CM: F32.0 ICD-9-CM: 296.21 Perimenopausal     ICD-10-CM: N95.1 ICD-9-CM: 627.2 Vitals BP Pulse Temp Resp Height(growth percentile) Weight(growth percentile) 113/74 (BP 1 Location: Right arm, BP Patient Position: Sitting) 72 97.3 °F (36.3 °C) (Oral) 18 5' 7\" (1.702 m) 208 lb 12.8 oz (94.7 kg) LMP SpO2 BMI OB Status Smoking Status 11/15/2017 (Approximate) 95% 32.7 kg/m2 Having regular periods Never Smoker BMI and BSA Data Body Mass Index Body Surface Area 32.7 kg/m 2 2.12 m 2 Preferred Pharmacy Pharmacy Name Phone [address], [address] [facility] [phone] Your Updated Medication List  
  
   
This list is accurate as of: 12/7/17  8:14 AM.  Always use your most recent med list.  
  
  
  
  
 sertraline 100 mg tablet Commonly known as:  ZOLOFT Take 1 Tab by mouth daily. SYNTHROID 137 mcg tablet Generic drug:  levothyroxine Take 137 mcg by mouth Daily (before breakfast). Brand name only Prescriptions Sent to Pharmacy Refills  
 sertraline (ZOLOFT) 100 mg tablet 4 Sig: Take 1 Tab by mouth daily. Class: Normal  
 Pharmacy: [address], [address] Ph #: [phone] Route: Oral  
 SYNTHROID 137 mcg tablet 4 Sig: Take 137 mcg by mouth Daily (before breakfast). Brand name only Class: Normal  
 Pharmacy: [address], [address] [address] Ph #: [phone] Route: Oral  
  
We Performed the Following AMB POC RAPID INFLUENZA TEST [85534 CPT(R)] AMB POC RAPID STREP A [92544 CPT(R)] Follow-up Instructions Return in about 6 months (around 6/7/2018) for rov, labs at next visit. To-Do List   
 12/07/2017 Lab:  CBC WITH AUTOMATED DIFF   
  
 12/07/2017 Lab:  LIPID PANEL   
  
 12/07/2017 Imaging:  [facility] MAMMO BI SCREENING INCL CAD   
  
 12/07/2017 Lab:  METABOLIC PANEL, COMPREHENSIVE   
  
 12/07/2017 Lab:  T4, FREE   
  
 12/07/2017 Lab:  TSH 3RD GENERATION   
  
 12/07/2017 Lab:  URINALYSIS W/ RFLX MICROSCOPIC Introducing [facility]! [employer] introduces Milabra patient portal. Now you can access parts of your medical record, email your doctor's office, and request medication refills online. 1. In your internet browser, go to [email or web address] 2. Click on the First Time User? Click Here link in the Sign In box. You will see the New Member Sign Up page. 3. Enter your Milabra Access Code exactly as it appears below. You will not need to use this code after youve completed the sign-up process. If you do not sign up before the expiration date, you must request a new code. · TruLeaf Access Code: [email or web address] Expires: 3/7/2018  8:14 AM 
 
4. Enter the last four digits of your Social Security Number (xxxx) and Date of Birth (mm/dd/yyyy) as indicated and click Submit. You will be taken to the next sign-up page. 5. Create a TruLeaf ID. This will be your TruLeaf login ID and cannot be changed, so think of one that is secure and easy to remember. 6. Create a TruLeaf password. You can change your password at any time. 7. Enter your Password Reset Question and Answer. This can be used at a later time if you forget your password. 8. Enter your e-mail address. You will receive e-mail notification when new information is available in [address] 9. Click Sign Up. You can now view and download portions of your medical record. 10. Click the Download Summary menu link to download a portable copy of your medical information. If you have questions, please visit the Frequently Asked Questions section of the TruLeaf website. Remember, TruLeaf is NOT to be used for urgent needs. For medical emergencies, dial 911. Now available from your iPhone and Android! Please provide this summary of care documentation to your next provider. Your primary care clinician is listed as [id number] [facility]. If you have any questions after today's visit, please call [phone].

## 2022-12-17 DIAGNOSIS — F32.0 CURRENT MILD EPISODE OF MAJOR DEPRESSIVE DISORDER WITHOUT PRIOR EPISODE (HCC): ICD-10-CM

## 2022-12-17 RX ORDER — SERTRALINE HYDROCHLORIDE 100 MG/1
TABLET, FILM COATED ORAL
Qty: 90 TABLET | Refills: 3 | Status: SHIPPED | OUTPATIENT
Start: 2022-12-17

## 2023-02-04 DIAGNOSIS — R73.03 PREDIABETES: Primary | ICD-10-CM

## 2023-02-05 DIAGNOSIS — E03.9 ACQUIRED HYPOTHYROIDISM: Primary | ICD-10-CM

## 2023-02-06 DIAGNOSIS — E03.9 ACQUIRED HYPOTHYROIDISM: Primary | ICD-10-CM

## 2023-04-23 ASSESSMENT — ENCOUNTER SYMPTOMS
SINUS PAIN: 0
CHEST TIGHTNESS: 0
ABDOMINAL PAIN: 0
DIARRHEA: 0
SHORTNESS OF BREATH: 0
RHINORRHEA: 0
WHEEZING: 0
COUGH: 0
SINUS PRESSURE: 0
SORE THROAT: 0
NAUSEA: 0
VOMITING: 0
TROUBLE SWALLOWING: 0

## 2023-04-24 ENCOUNTER — OFFICE VISIT (OUTPATIENT)
Dept: FAMILY MEDICINE CLINIC | Facility: CLINIC | Age: 53
End: 2023-04-24
Payer: COMMERCIAL

## 2023-04-24 VITALS
HEART RATE: 81 BPM | DIASTOLIC BLOOD PRESSURE: 62 MMHG | BODY MASS INDEX: 32.18 KG/M2 | SYSTOLIC BLOOD PRESSURE: 110 MMHG | RESPIRATION RATE: 16 BRPM | HEIGHT: 67 IN | OXYGEN SATURATION: 96 % | WEIGHT: 205 LBS | TEMPERATURE: 98.2 F

## 2023-04-24 DIAGNOSIS — E03.9 ACQUIRED HYPOTHYROIDISM: ICD-10-CM

## 2023-04-24 DIAGNOSIS — R73.03 PREDIABETES: ICD-10-CM

## 2023-04-24 DIAGNOSIS — N84.0 ENDOMETRIAL POLYP: Primary | ICD-10-CM

## 2023-04-24 DIAGNOSIS — Z01.818 PREOP EXAMINATION: ICD-10-CM

## 2023-04-24 LAB — HBA1C MFR BLD: 5.9 %

## 2023-04-24 PROCEDURE — 99214 OFFICE O/P EST MOD 30 MIN: CPT | Performed by: FAMILY MEDICINE

## 2023-04-24 PROCEDURE — 83036 HEMOGLOBIN GLYCOSYLATED A1C: CPT | Performed by: FAMILY MEDICINE

## 2023-04-24 RX ORDER — FERROUS SULFATE 325(65) MG
325 TABLET ORAL
COMMUNITY

## 2023-04-24 SDOH — ECONOMIC STABILITY: INCOME INSECURITY: HOW HARD IS IT FOR YOU TO PAY FOR THE VERY BASICS LIKE FOOD, HOUSING, MEDICAL CARE, AND HEATING?: SOMEWHAT HARD

## 2023-04-24 SDOH — ECONOMIC STABILITY: FOOD INSECURITY: WITHIN THE PAST 12 MONTHS, THE FOOD YOU BOUGHT JUST DIDN'T LAST AND YOU DIDN'T HAVE MONEY TO GET MORE.: NEVER TRUE

## 2023-04-24 SDOH — ECONOMIC STABILITY: FOOD INSECURITY: WITHIN THE PAST 12 MONTHS, YOU WORRIED THAT YOUR FOOD WOULD RUN OUT BEFORE YOU GOT MONEY TO BUY MORE.: NEVER TRUE

## 2023-04-24 SDOH — ECONOMIC STABILITY: HOUSING INSECURITY
IN THE LAST 12 MONTHS, WAS THERE A TIME WHEN YOU DID NOT HAVE A STEADY PLACE TO SLEEP OR SLEPT IN A SHELTER (INCLUDING NOW)?: NO

## 2023-04-24 ASSESSMENT — PATIENT HEALTH QUESTIONNAIRE - PHQ9
8. MOVING OR SPEAKING SO SLOWLY THAT OTHER PEOPLE COULD HAVE NOTICED. OR THE OPPOSITE, BEING SO FIGETY OR RESTLESS THAT YOU HAVE BEEN MOVING AROUND A LOT MORE THAN USUAL: 0
SUM OF ALL RESPONSES TO PHQ QUESTIONS 1-9: 3
9. THOUGHTS THAT YOU WOULD BE BETTER OFF DEAD, OR OF HURTING YOURSELF: 0
SUM OF ALL RESPONSES TO PHQ QUESTIONS 1-9: 3
3. TROUBLE FALLING OR STAYING ASLEEP: 1
SUM OF ALL RESPONSES TO PHQ QUESTIONS 1-9: 3
7. TROUBLE CONCENTRATING ON THINGS, SUCH AS READING THE NEWSPAPER OR WATCHING TELEVISION: 0
5. POOR APPETITE OR OVEREATING: 0
SUM OF ALL RESPONSES TO PHQ QUESTIONS 1-9: 3
10. IF YOU CHECKED OFF ANY PROBLEMS, HOW DIFFICULT HAVE THESE PROBLEMS MADE IT FOR YOU TO DO YOUR WORK, TAKE CARE OF THINGS AT HOME, OR GET ALONG WITH OTHER PEOPLE: 0
1. LITTLE INTEREST OR PLEASURE IN DOING THINGS: 0
SUM OF ALL RESPONSES TO PHQ9 QUESTIONS 1 & 2: 0
6. FEELING BAD ABOUT YOURSELF - OR THAT YOU ARE A FAILURE OR HAVE LET YOURSELF OR YOUR FAMILY DOWN: 1
2. FEELING DOWN, DEPRESSED OR HOPELESS: 0
4. FEELING TIRED OR HAVING LITTLE ENERGY: 1

## 2023-04-24 NOTE — PATIENT INSTRUCTIONS
Current Status:  Euthyroid on current dose of levothyroxine  Prediabetes-stable  No medical contraindications to the planned procedure    Health Maintenance Recommendations:  COVID-19 immunization with bivalent vaccine    Plan:  Medically cleared for surgery  Schedule lab appointment followed by annual physical exam appointment after 9/22/2023, return sooner any problems  Please always arrive at least 15 minutes before your scheduled appointment time.

## 2023-04-24 NOTE — PROGRESS NOTES
Vitor Ceballos is a 45 y.o. female (: 1970) presenting to address:    Chief Complaint   Patient presents with    Pre-op Exam       Vitals:    23 1133   BP: 110/62   Pulse: 81   Resp: 16   Temp: 98.2 °F (36.8 °C)   SpO2: 96%       Coordination of Care Questionaire:   1. \"Have you been to the ER, urgent care clinic since your last visit? Hospitalized since your last visit? \" No    2. \"Have you seen or consulted any other health care providers outside of the [address] [place] [address] since your last visit? \" No     3. For patients aged 39-70: Has the patient had a colonoscopy / FIT/ Cologuard? Yes - no Care Gap present      If the patient is female:    4. For patients aged 41-77: Has the patient had a mammogram within the past 2 years? Yes care gap present will request most recent results . 5. For patients aged 21-65: Has the patient had a pap smear? Yes - Care Gap present. Rooming MA/LPN to request most recent results    Advanced Directive:   1. Do you have an Advanced Directive? No    2. Would you like information on Advanced Directives?  No

## 2023-10-18 RX ORDER — LEVOTHYROXINE SODIUM 0.12 MG/1
TABLET ORAL
Qty: 30 TABLET | Refills: 0 | Status: SHIPPED | OUTPATIENT
Start: 2023-10-18

## 2023-10-18 NOTE — TELEPHONE ENCOUNTER
Last refilled 11/21/22 90 with 2 .  Last ov 4/24/23   Future Appointments   Date Time Provider [address]   10/24/2023  3:30 PM Jim Ybarra MD BS[facility] BS AMB

## 2023-10-20 RX ORDER — LEVOTHYROXINE SODIUM 0.12 MG/1
TABLET ORAL
Qty: 30 TABLET | Refills: 0 | OUTPATIENT
Start: 2023-10-20

## 2023-10-20 NOTE — TELEPHONE ENCOUNTER
Thyroid medication was refilled on 10/18/23, qty 30 w/no refills; pt picked up on 10/19/23; pt has appt on 10/24/23 and will request additional refills at that visit.

## 2023-10-23 DIAGNOSIS — R73.03 PREDIABETES: ICD-10-CM

## 2023-10-23 DIAGNOSIS — E03.9 ACQUIRED HYPOTHYROIDISM: ICD-10-CM

## 2023-10-23 DIAGNOSIS — E78.2 MIXED HYPERLIPIDEMIA: ICD-10-CM

## 2023-10-23 DIAGNOSIS — Z00.00 ROUTINE HEALTH MAINTENANCE: Primary | ICD-10-CM

## 2023-10-23 PROBLEM — Z86.0101 HX OF ADENOMATOUS POLYP OF COLON: Status: ACTIVE | Noted: 2023-10-23

## 2023-10-23 PROBLEM — F51.04 PSYCHOPHYSIOLOGIC INSOMNIA: Status: ACTIVE | Noted: 2023-10-23

## 2023-10-23 PROBLEM — Z86.010 HX OF ADENOMATOUS POLYP OF COLON: Status: ACTIVE | Noted: 2023-10-23

## 2023-10-23 ASSESSMENT — ENCOUNTER SYMPTOMS
DIARRHEA: 0
ANAL BLEEDING: 0
COUGH: 0
CONSTIPATION: 0
NAUSEA: 0
BLOOD IN STOOL: 0
VOMITING: 0
SORE THROAT: 0
SHORTNESS OF BREATH: 0
WHEEZING: 0
ABDOMINAL PAIN: 0

## 2023-10-24 ENCOUNTER — OFFICE VISIT (OUTPATIENT)
Dept: FAMILY MEDICINE CLINIC | Facility: CLINIC | Age: 53
End: 2023-10-24
Payer: COMMERCIAL

## 2023-10-24 VITALS
RESPIRATION RATE: 16 BRPM | DIASTOLIC BLOOD PRESSURE: 80 MMHG | TEMPERATURE: 97.6 F | HEIGHT: 67 IN | HEART RATE: 82 BPM | SYSTOLIC BLOOD PRESSURE: 120 MMHG | OXYGEN SATURATION: 97 % | WEIGHT: 206 LBS | BODY MASS INDEX: 32.33 KG/M2

## 2023-10-24 DIAGNOSIS — Z86.010 HX OF ADENOMATOUS POLYP OF COLON: ICD-10-CM

## 2023-10-24 DIAGNOSIS — E78.2 MIXED HYPERLIPIDEMIA: ICD-10-CM

## 2023-10-24 DIAGNOSIS — Z00.00 ROUTINE GENERAL MEDICAL EXAMINATION AT A HEALTH CARE FACILITY: Primary | ICD-10-CM

## 2023-10-24 DIAGNOSIS — E03.9 ACQUIRED HYPOTHYROIDISM: ICD-10-CM

## 2023-10-24 DIAGNOSIS — R73.03 PREDIABETES: ICD-10-CM

## 2023-10-24 DIAGNOSIS — Z12.31 BREAST CANCER SCREENING BY MAMMOGRAM: ICD-10-CM

## 2023-10-24 PROCEDURE — 99396 PREV VISIT EST AGE 40-64: CPT | Performed by: FAMILY MEDICINE

## 2023-10-26 LAB
BACTERIA: NEGATIVE
BILIRUB SERPL-MCNC: NEGATIVE MG/DL
BLOOD: NEGATIVE
CLARITY: CLEAR
COLOR: YELLOW
EPITHELIAL CELLS: ABNORMAL /HPF
GLUCOSE: NEGATIVE MG/DL
HYALINE CASTS: ABNORMAL /LPF (ref 0–2)
KETONES, URINE: NEGATIVE MG/DL
LEUKOCYTE ESTERASE, URINE: ABNORMAL
NITRITE, URINE: NEGATIVE
PH, URINE: 5.5 PH (ref 5–8)
PROTEIN UA: NEGATIVE MG/DL
RBC URINE: ABNORMAL /HPF
SPECIFIC GRAVITY: 1.01 (ref 1–1.03)
T4 FREE: 1.1 NG/DL (ref 0.9–1.8)
TSH SERPL DL<=0.05 MIU/L-ACNC: 2.06 MCU/ML (ref 0.27–4.2)
UROBILINOGEN: 0.2 MG/DL
WBC UA: ABNORMAL /HPF (ref 0–5)

## 2023-11-09 ENCOUNTER — TELEPHONE (OUTPATIENT)
Dept: FAMILY MEDICINE CLINIC | Facility: CLINIC | Age: 53
End: 2023-11-09

## 2023-11-09 DIAGNOSIS — F51.04 PSYCHOPHYSIOLOGICAL INSOMNIA: Primary | ICD-10-CM

## 2023-11-09 RX ORDER — ZOLPIDEM TARTRATE 5 MG/1
5 TABLET ORAL NIGHTLY PRN
Status: CANCELLED | OUTPATIENT
Start: 2023-11-09

## 2023-11-09 RX ORDER — ZOLPIDEM TARTRATE 5 MG/1
5 TABLET ORAL NIGHTLY PRN
Qty: 30 TABLET | Refills: 0 | Status: SHIPPED | OUTPATIENT
Start: 2023-11-09 | End: 2023-12-09

## 2023-11-09 NOTE — TELEPHONE ENCOUNTER
Pt called stating that she tested positive for Covid and has been having trouble sleeping. Pt is requesting a new prescription for Ambien. Medication has been discontinued lasct OV 10/24/23.  Please advise

## 2023-11-09 NOTE — TELEPHONE ENCOUNTER
Patient called back I asked if her issue with sleeping was related to cough , she said no , she just cant sleep and hasn't gotten a full nights rest in a week and was hoping Dr Phil Núñez could send in a script for Lizz

## 2023-11-22 RX ORDER — LEVOTHYROXINE SODIUM 0.12 MG/1
TABLET ORAL
Qty: 100 TABLET | Refills: 3 | Status: SHIPPED | OUTPATIENT
Start: 2023-11-22

## 2024-01-03 RX ORDER — SERTRALINE HYDROCHLORIDE 100 MG/1
TABLET, FILM COATED ORAL
Qty: 90 TABLET | Refills: 3 | Status: SHIPPED | OUTPATIENT
Start: 2024-01-03

## 2024-01-03 NOTE — TELEPHONE ENCOUNTER
Noni Arango called for their medication refill.    Last Office visit:  10/24/2023    Last Filled: 12/27/2022; Qty 90 w/ 3 refills     Follow up visit:  No future appointments.       Patient has declined cardiac rehabilitation treatment at this time. Will follow to progress.

## 2024-08-06 ENCOUNTER — TELEPHONE (OUTPATIENT)
Dept: FAMILY MEDICINE CLINIC | Facility: CLINIC | Age: 54
End: 2024-08-06

## 2024-08-06 NOTE — TELEPHONE ENCOUNTER
Mammogram from 8/3/2024 showed no evidence of malignancy.  Annual screening mammography is recommended.

## 2024-10-09 ENCOUNTER — TELEPHONE (OUTPATIENT)
Dept: FAMILY MEDICINE CLINIC | Facility: CLINIC | Age: 54
End: 2024-10-09

## 2024-11-11 ENCOUNTER — NURSE ONLY (OUTPATIENT)
Dept: FAMILY MEDICINE CLINIC | Facility: CLINIC | Age: 54
End: 2024-11-11

## 2024-11-11 DIAGNOSIS — Z86.0101 HX OF ADENOMATOUS POLYP OF COLON: ICD-10-CM

## 2024-11-11 DIAGNOSIS — Z00.00 ROUTINE HEALTH MAINTENANCE: Primary | ICD-10-CM

## 2024-11-11 DIAGNOSIS — E03.9 ACQUIRED HYPOTHYROIDISM: ICD-10-CM

## 2024-11-11 DIAGNOSIS — E78.2 MIXED HYPERLIPIDEMIA: ICD-10-CM

## 2024-11-11 DIAGNOSIS — R73.03 PREDIABETES: ICD-10-CM

## 2024-11-11 RX ORDER — SERTRALINE HYDROCHLORIDE 100 MG/1
100 TABLET, FILM COATED ORAL NIGHTLY
Qty: 90 TABLET | Refills: 0 | Status: SHIPPED | OUTPATIENT
Start: 2024-11-11

## 2024-11-11 NOTE — TELEPHONE ENCOUNTER
The requested prescription for sertraline 100 mg daily has been refilled for 90 days however the patient is overdue for lab, follow-up and annual physical exam appointments.  Please assist her with scheduling the necessary appointments.  No additional refills can be provided after this without appropriate prior evaluation   Acute lymphoblastic leukemia (ALL) not having achieved remission

## 2024-11-13 NOTE — TELEPHONE ENCOUNTER
Left detailed message for patient to call and schedule lab and cpe and that no refills will be given until she is seen.

## 2024-11-19 ASSESSMENT — ENCOUNTER SYMPTOMS
NAUSEA: 0
SHORTNESS OF BREATH: 0
DIARRHEA: 0
ANAL BLEEDING: 0
WHEEZING: 0
VOMITING: 0
SORE THROAT: 0
COUGH: 0
ABDOMINAL PAIN: 0
CONSTIPATION: 0
BLOOD IN STOOL: 0

## 2024-11-19 NOTE — PROGRESS NOTES
HISTORY OF PRESENT ILLNESS  Noni Arango  is a 54 y.o. y.o. female    She presents for health assessment, preventative care and follow-up with a history of acquired hypothyroidism, hyperlipidemia,  prediabetes and tubular adenoma of the colon        Mr#: [id number]      Past Medical History:   Diagnosis Date    Depression 10/17/2011    Hypothyroid 10/17/2011       Past Surgical History:   Procedure Laterality Date     SECTION      times 2    COLONOSCOPY  2019    Single polyp from the transverse colon, tubular adenoma, 5-year follow-up    DILATION AND CURETTAGE OF UTERUS N/A 2023    HSTEROSCOPY; DILATION & CURETTAGE; MYOSURE REMOVAL OF UTERINE MASS;   POLYPECTOMY performed by Tara Caal MD at [facility] MAIN OR       Family History   Problem Relation Age of Onset    Heart Disease Mother     Hypertension Mother        No Known Allergies    Social History     Tobacco Use   Smoking Status Never   Smokeless Tobacco Never       Social History     Substance and Sexual Activity   Alcohol Use Yes       Immunization History   Administered Date(s) Administered    COVID-19, PFIZER PURPLE top, DILUTE for use, (age 12 y+), 30mcg/0.3mL 2021, 2021    Influenza, FLUARIX, FLULAVAL, FLUZONE (age 6 mo+) and AFLURIA, (age 3 y+), Quadv PF, 0.5mL 2018    Pneumococcal, PPSV23, PNEUMOVAX 23, (age 2y+), SC/IM, 0.5mL 2018    TDaP, ADACEL (age 10y-64y), BOOSTRIX (age 10y+), IM, 0.5mL 10/17/2011    Zoster Recombinant (Shingrix) 2021, 2021       Patient Active Problem List   Diagnosis    Depression    Perimenopausal    Acquired hypothyroidism    Mixed hyperlipidemia    Prediabetes    Genital herpes    Tubular adenoma of colon    Psychophysiologic insomnia    Hx of adenomatous polyp of colon         Current Outpatient Medications:     sertraline (ZOLOFT) 100 MG tablet, TAKE 1 TABLET BY MOUTH EVERY NIGHT, Disp: 90 tablet, Rfl: 0    levothyroxine (SYNTHROID) 125 MCG tablet, TAKE 1 TABLET BY

## 2024-11-20 ENCOUNTER — OFFICE VISIT (OUTPATIENT)
Dept: FAMILY MEDICINE CLINIC | Facility: CLINIC | Age: 54
End: 2024-11-20
Payer: COMMERCIAL

## 2024-11-20 ENCOUNTER — HOSPITAL ENCOUNTER (OUTPATIENT)
Facility: HOSPITAL | Age: 54
Setting detail: SPECIMEN
Discharge: HOME | End: 2024-11-23

## 2024-11-20 VITALS
TEMPERATURE: 98 F | SYSTOLIC BLOOD PRESSURE: 120 MMHG | WEIGHT: 211 LBS | RESPIRATION RATE: 16 BRPM | BODY MASS INDEX: 33.12 KG/M2 | OXYGEN SATURATION: 97 % | HEIGHT: 67 IN | HEART RATE: 72 BPM | DIASTOLIC BLOOD PRESSURE: 66 MMHG

## 2024-11-20 DIAGNOSIS — Z00.00 ROUTINE GENERAL MEDICAL EXAMINATION AT A HEALTH CARE FACILITY: Primary | ICD-10-CM

## 2024-11-20 DIAGNOSIS — E78.2 MIXED HYPERLIPIDEMIA: ICD-10-CM

## 2024-11-20 DIAGNOSIS — Z86.0101 HX OF ADENOMATOUS POLYP OF COLON: ICD-10-CM

## 2024-11-20 DIAGNOSIS — E03.9 ACQUIRED HYPOTHYROIDISM: ICD-10-CM

## 2024-11-20 DIAGNOSIS — R73.03 PREDIABETES: ICD-10-CM

## 2024-11-20 LAB — SENTARA SPECIMEN COLLECTION: NORMAL

## 2024-11-20 PROCEDURE — 99001 SPECIMEN HANDLING PT-LAB: CPT

## 2024-11-20 PROCEDURE — 90739 HEPB VACC 2/4 DOSE ADULT IM: CPT | Performed by: FAMILY MEDICINE

## 2024-11-20 PROCEDURE — 90471 IMMUNIZATION ADMIN: CPT | Performed by: FAMILY MEDICINE

## 2024-11-20 PROCEDURE — 99396 PREV VISIT EST AGE 40-64: CPT | Performed by: FAMILY MEDICINE

## 2024-11-20 SDOH — ECONOMIC STABILITY: FOOD INSECURITY: WITHIN THE PAST 12 MONTHS, THE FOOD YOU BOUGHT JUST DIDN'T LAST AND YOU DIDN'T HAVE MONEY TO GET MORE.: NEVER TRUE

## 2024-11-20 SDOH — ECONOMIC STABILITY: FOOD INSECURITY: WITHIN THE PAST 12 MONTHS, YOU WORRIED THAT YOUR FOOD WOULD RUN OUT BEFORE YOU GOT MONEY TO BUY MORE.: NEVER TRUE

## 2024-11-20 SDOH — ECONOMIC STABILITY: INCOME INSECURITY: HOW HARD IS IT FOR YOU TO PAY FOR THE VERY BASICS LIKE FOOD, HOUSING, MEDICAL CARE, AND HEATING?: NOT HARD AT ALL

## 2024-11-20 ASSESSMENT — PATIENT HEALTH QUESTIONNAIRE - PHQ9
SUM OF ALL RESPONSES TO PHQ9 QUESTIONS 1 & 2: 0
6. FEELING BAD ABOUT YOURSELF - OR THAT YOU ARE A FAILURE OR HAVE LET YOURSELF OR YOUR FAMILY DOWN: NOT AT ALL
3. TROUBLE FALLING OR STAYING ASLEEP: NOT AT ALL
SUM OF ALL RESPONSES TO PHQ QUESTIONS 1-9: 0
SUM OF ALL RESPONSES TO PHQ QUESTIONS 1-9: 0
8. MOVING OR SPEAKING SO SLOWLY THAT OTHER PEOPLE COULD HAVE NOTICED. OR THE OPPOSITE, BEING SO FIGETY OR RESTLESS THAT YOU HAVE BEEN MOVING AROUND A LOT MORE THAN USUAL: NOT AT ALL
10. IF YOU CHECKED OFF ANY PROBLEMS, HOW DIFFICULT HAVE THESE PROBLEMS MADE IT FOR YOU TO DO YOUR WORK, TAKE CARE OF THINGS AT HOME, OR GET ALONG WITH OTHER PEOPLE: NOT DIFFICULT AT ALL
SUM OF ALL RESPONSES TO PHQ QUESTIONS 1-9: 0
1. LITTLE INTEREST OR PLEASURE IN DOING THINGS: NOT AT ALL
2. FEELING DOWN, DEPRESSED OR HOPELESS: NOT AT ALL
9. THOUGHTS THAT YOU WOULD BE BETTER OFF DEAD, OR OF HURTING YOURSELF: NOT AT ALL
4. FEELING TIRED OR HAVING LITTLE ENERGY: NOT AT ALL
7. TROUBLE CONCENTRATING ON THINGS, SUCH AS READING THE NEWSPAPER OR WATCHING TELEVISION: NOT AT ALL
5. POOR APPETITE OR OVEREATING: NOT AT ALL
SUM OF ALL RESPONSES TO PHQ QUESTIONS 1-9: 0

## 2024-11-20 NOTE — PATIENT INSTRUCTIONS
Current Status:  Status of hypothyroidism, hyperlipidemia and prediabetes to be determined pending lab results  History of colonic tubular adenomas overdue for colonoscopy surveillance    Health Maintenance Recommendations:    Influenza immunization declined  Hepatitis B immunization series #1 today, #2 anytime greater than 30 days from now with a nurse visit appointment or at next routine follow-up  COVID-19 immunization booster-available at the pharmacy  Mammogram due July 2025  Pap smear due August 2025  Colonoscopy overdue since August 2024    Plan:  Lab studies ordered, further disposition pending lab results if indicated  Continue levothyroxine 125 mcg daily pending lab results  Continue sertraline 100 mg daily  Avoid dietary starch and sugar and as much as possible follow program of regular aerobic exercise.  Return for annual physical exam and follow-up in 1 year, return sooner with any problems  Please arrive at least 15 minutes prior to your scheduled appointment time

## 2024-11-20 NOTE — PROGRESS NOTES
Noni Arango is a 54 y.o. female (: 1970) presenting to address:    Chief Complaint   Patient presents with    Annual Exam    Immunizations     Declined flu shot        Vitals:    24 1022   BP: 120/66   Pulse: 72   Resp: 16   Temp: 98 °F (36.7 °C)   SpO2: 97%       \"Have you been to the ER, urgent care clinic since your last visit?  Hospitalized since your last visit?\"    Yes July [facility].     “Have you seen or consulted any other health care providers outside of [facility] since your last visit?”    NO    “Have you had a colorectal cancer screening such as a colonoscopy/FIT/Cologuard?    NO    Date of last Colonoscopy: 2019  No cologuard on file  No FIT/FOBT on file   No flexible sigmoidoscopy on file        Hep b #1 Immunization/s administered 2024 by Natasha Fenton LPN   Patient tolerated procedure well.  No reactions noted.

## 2024-11-21 LAB
A/G RATIO: 1.8 RATIO (ref 1.1–2.6)
ALBUMIN: 4.3 G/DL (ref 3.5–5)
ALP BLD-CCNC: 73 U/L (ref 25–115)
ALT SERPL-CCNC: 30 U/L (ref 5–40)
ANION GAP SERPL CALCULATED.3IONS-SCNC: 10 MMOL/L (ref 3–15)
AST SERPL-CCNC: 17 U/L (ref 10–37)
BACTERIA: PRESENT
BASOPHILS ABSOLUTE: 0 K/UL (ref 0–0.2)
BASOPHILS RELATIVE PERCENT: 1 % (ref 0–2)
BILIRUB SERPL-MCNC: 0.3 MG/DL (ref 0.2–1.2)
BILIRUBIN, URINE: NEGATIVE
BLOOD: NEGATIVE
BUN BLDV-MCNC: 18 MG/DL (ref 6–22)
CALCIUM SERPL-MCNC: 9.1 MG/DL (ref 8.4–10.5)
CHLORIDE BLD-SCNC: 103 MMOL/L (ref 98–110)
CHOLESTEROL, TOTAL: 173 MG/DL (ref 110–200)
CHOLESTEROL/HDL RATIO: 5.1 (ref 0–5)
CLARITY, UA: ABNORMAL
CO2: 25 MMOL/L (ref 20–32)
COLOR, UA: YELLOW
CREAT SERPL-MCNC: 0.7 MG/DL (ref 0.5–1.2)
EOSINOPHIL # BLD: 4 % (ref 0–6)
EOSINOPHILS ABSOLUTE: 0.3 K/UL (ref 0–0.5)
EPITHELIAL CELLS: ABNORMAL /HPF
ESTIMATED AVERAGE GLUCOSE: 146 MG/DL (ref 91–123)
GFR, ESTIMATED: >60 ML/MIN/1.73 SQ.M.
GLOBULIN: 2.4 G/DL (ref 2–4)
GLUCOSE URINE: NEGATIVE MG/DL
GLUCOSE: 170 MG/DL (ref 70–99)
HBA1C MFR BLD: 6.7 % (ref 4.8–5.6)
HCT VFR BLD CALC: 42.8 % (ref 35.1–48)
HDLC SERPL-MCNC: 34 MG/DL
HEMOGLOBIN: 13.8 G/DL (ref 11.7–16)
HYALINE CASTS: ABNORMAL /LPF (ref 0–2)
KETONES, URINE: NEGATIVE MG/DL
LDL CHOLESTEROL: 115 MG/DL (ref 50–99)
LDL/HDL RATIO: 3.4
LEUKOCYTE ESTERASE, URINE: ABNORMAL
LYMPHOCYTES # BLD: 28 % (ref 20–45)
LYMPHOCYTES ABSOLUTE: 1.9 K/UL (ref 1–4.8)
MCH RBC QN AUTO: 30 PG (ref 26–34)
MCHC RBC AUTO-ENTMCNC: 32 G/DL (ref 31–36)
MCV RBC AUTO: 94 FL (ref 80–99)
MONOCYTES ABSOLUTE: 0.5 K/UL (ref 0.1–1)
MONOCYTES: 8 % (ref 3–12)
NEUTROPHILS ABSOLUTE: 4.2 K/UL (ref 1.8–7.7)
NEUTROPHILS: 60 % (ref 40–75)
NITRITE, URINE: NEGATIVE
NON-HDL CHOLESTEROL: 139 MG/DL
PDW BLD-RTO: 12.7 % (ref 10–15.5)
PH, URINE: 5.5 PH (ref 5–8)
PLATELET # BLD: 249 K/UL (ref 140–440)
PMV BLD AUTO: 12.2 FL (ref 9–13)
POTASSIUM SERPL-SCNC: 4.3 MMOL/L (ref 3.5–5.5)
PROTEIN UA: NEGATIVE MG/DL
RBC # BLD: 4.55 M/UL (ref 3.8–5.2)
RBC URINE: ABNORMAL /HPF
SODIUM BLD-SCNC: 138 MMOL/L (ref 133–145)
SPECIFIC GRAVITY UA: 1.02 (ref 1–1.03)
TOTAL PROTEIN: 6.7 G/DL (ref 6.4–8.3)
TRIGL SERPL-MCNC: 119 MG/DL (ref 40–149)
TSH SERPL DL<=0.05 MIU/L-ACNC: 0.54 MCU/ML (ref 0.27–4.2)
UROBILINOGEN, URINE: 0.2 MG/DL
VLDLC SERPL CALC-MCNC: 24 MG/DL (ref 8–30)
WBC # BLD: 6.9 K/UL (ref 4–11)
WBC UA: ABNORMAL /HPF (ref 0–5)

## 2024-12-02 ENCOUNTER — OFFICE VISIT (OUTPATIENT)
Dept: FAMILY MEDICINE CLINIC | Facility: CLINIC | Age: 54
End: 2024-12-02
Payer: COMMERCIAL

## 2024-12-02 VITALS
HEIGHT: 67 IN | WEIGHT: 205 LBS | TEMPERATURE: 98.2 F | SYSTOLIC BLOOD PRESSURE: 120 MMHG | DIASTOLIC BLOOD PRESSURE: 70 MMHG | RESPIRATION RATE: 16 BRPM | BODY MASS INDEX: 32.18 KG/M2 | OXYGEN SATURATION: 96 % | HEART RATE: 85 BPM

## 2024-12-02 DIAGNOSIS — B34.9 VIRAL SYNDROME: ICD-10-CM

## 2024-12-02 DIAGNOSIS — E11.9 TYPE 2 DIABETES MELLITUS WITHOUT COMPLICATION, WITHOUT LONG-TERM CURRENT USE OF INSULIN (HCC): Primary | ICD-10-CM

## 2024-12-02 LAB
EXP DATE SOLUTION: NORMAL
EXP DATE SWAB: NORMAL
EXPIRATION DATE: NORMAL
LOT NUMBER POC: NORMAL
LOT NUMBER SOLUTION: NORMAL
LOT NUMBER SWAB: NORMAL
SARS-COV-2 RNA, POC: NEGATIVE

## 2024-12-02 PROCEDURE — 3044F HG A1C LEVEL LT 7.0%: CPT | Performed by: FAMILY MEDICINE

## 2024-12-02 PROCEDURE — 87635 SARS-COV-2 COVID-19 AMP PRB: CPT | Performed by: FAMILY MEDICINE

## 2024-12-02 PROCEDURE — 99214 OFFICE O/P EST MOD 30 MIN: CPT | Performed by: FAMILY MEDICINE

## 2024-12-02 RX ORDER — DICYCLOMINE HYDROCHLORIDE 10 MG/1
CAPSULE ORAL
Qty: 120 CAPSULE | Refills: 2 | Status: SHIPPED | OUTPATIENT
Start: 2024-12-02

## 2024-12-02 RX ORDER — ONDANSETRON 4 MG/1
4 TABLET, ORALLY DISINTEGRATING ORAL 3 TIMES DAILY PRN
Qty: 30 TABLET | Refills: 1 | Status: SHIPPED | OUTPATIENT
Start: 2024-12-02

## 2024-12-02 ASSESSMENT — ENCOUNTER SYMPTOMS
BLOOD IN STOOL: 0
VOMITING: 1
SORE THROAT: 0
ABDOMINAL PAIN: 1
COUGH: 0
NAUSEA: 1
DIARRHEA: 0

## 2024-12-02 NOTE — PATIENT INSTRUCTIONS
Current Status:  Nausea, vomiting, abdominal cramping-viral syndrome?,  Seems less likely that this would be an adverse effect of the hepatitis B immunization 2 weeks ago.  Newly diagnosed type 2 diabetes with a hemoglobin A1c of 6.7% up from 6.0% last year    Health Maintenance Recommendations:  Hepatitis B immunization #2 due after 12/20/2024 but adverse effects reported after the first immunization  COVID-19 immunization booster-available at the pharmacy  Mammogram due July 2025  Pap smear due August 2025  Colonoscopy overdue since August 2024  Diabetic eye exam    Plan:  Begin ondansetron ODT, 4 mg up to 3 times daily if needed for nausea  Dicyclomine 10 mg up to 4 times daily before meals and at bedtime as needed for abdominal cramping  Other recommendations recommendations:  Begin metformin  mg daily with food to help prevent further increase in glucose levels  Begin atorvastatin 40 mg daily (LDL cholesterol goal less than 70)  Begin losartan 25 mg daily for protection of kidney function in the presence of diabetes  Currently declines any medication additions  Avoid dietary salt, starch and sugar and as much as possible follow program of regular aerobic exercise.  Return for follow-up with an in office hemoglobin A1c in 3 months or sooner with any problems  Please always arrive at least 15 minutes before your scheduled appointment time.

## 2024-12-02 NOTE — PROGRESS NOTES
HISTORY OF PRESENT ILLNESS  Noni Arango  is a 54 y.o. y.o. female    She reports that she has not felt well since receiving a hepatitis B immunization on 2024 and her symptoms seem to be worsening.  She describes nausea and vomiting as well as abdominal cramping which started later in the day after she received the immunization.  Since then her symptoms have improved minimally with persistent nausea and abdominal cramping reported.  She has not experienced fever, body aches, headache, respiratory symptoms or diarrhea.  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        Mr#: [id number]      Past Medical History:   Diagnosis Date    Depression 10/17/2011    Hypothyroid 10/17/2011       Past Surgical History:   Procedure Laterality Date     SECTION      times 2    COLONOSCOPY  2019    Single polyp from the transverse colon, tubular adenoma, 5-year follow-up    DILATION AND CURETTAGE OF UTERUS N/A 2023    HSTEROSCOPY; DILATION & CURETTAGE; MYOSURE REMOVAL OF UTERINE MASS;   POLYPECTOMY performed by Tara Caal MD at [facility] MAIN OR       Family History   Problem Relation Age of Onset    Heart Disease Mother     Hypertension Mother        No Known Allergies    Social History     Tobacco Use   Smoking Status Never   Smokeless Tobacco Never       Social History     Substance and Sexual Activity   Alcohol Use Yes       Immunization History   Administered Date(s) Administered    COVID-19, PFIZER PURPLE top, DILUTE for use, (age 12 y+), 30mcg/0.3mL 2021, 2021    Hep B, HEPLISAV-B, (age 18y+), IM, 0.5mL 2024    Influenza, FLUARIX, FLULAVAL, FLUZONE (age 6 mo+) and AFLURIA, (age 3 y+), Quadv PF, 0.5mL 2018    Pneumococcal, PPSV23, PNEUMOVAX 23, (age 2y+), SC/IM, 0.5mL 2018    TDaP, ADACEL (age 10y-64y), BOOSTRIX (age 10y+), IM, 0.5mL 10/17/2011    Zoster Recombinant (Shingrix) 2021, 2021

## 2024-12-02 NOTE — PROGRESS NOTES
Noni Arango is a 54 y.o. female (: 1970) presenting to address:    Chief Complaint   Patient presents with    Headache     Wednesday after Hep b Vaccine .     Abdominal Pain     Cramping.     Vomiting       Vitals:    24 1453   BP: 120/70   Pulse: 85   Resp: 16   Temp: 98.2 °F (36.8 °C)   SpO2: 96%       \"Have you been to the ER, urgent care clinic since your last visit?  Hospitalized since your last visit?\"    NO    “Have you seen or consulted any other health care providers outside of [facility] since your last visit?”    NO    “Have you had a colorectal cancer screening such as a colonoscopy/FIT/Cologuard?    NO    Date of last Colonoscopy: 2019  No cologuard on file  No FIT/FOBT on file   No flexible sigmoidoscopy on file

## 2024-12-31 RX ORDER — LEVOTHYROXINE SODIUM 125 UG/1
TABLET ORAL
Qty: 100 TABLET | Refills: 3 | Status: SHIPPED | OUTPATIENT
Start: 2024-12-31

## 2024-12-31 NOTE — TELEPHONE ENCOUNTER
Medication Refill Request:  levothyroxine (SYNTHROID) 125 MCG tablet     Last Filled: 09/28/2024  Last Ordered:  100 tablet w/ 3 refills on 11/22/2023  Last OV: 12/02/2024  Sig:  TAKE 1 TABLET BY MOUTH EVERY DAY BEFORE BREAKFAST    Future Appointments   Date Time Provider Department Center   3/3/2025  9:30 AM [facility]

## 2025-02-07 RX ORDER — LEVOTHYROXINE SODIUM 125 UG/1
TABLET ORAL
Qty: 100 TABLET | Refills: 3 | OUTPATIENT
Start: 2025-02-07

## 2025-02-07 NOTE — TELEPHONE ENCOUNTER
Prescription was sent in December for 90 with 3 refills. I called pharmacy they will get a refill ready for patient.

## 2025-02-13 RX ORDER — SERTRALINE HYDROCHLORIDE 100 MG/1
100 TABLET, FILM COATED ORAL NIGHTLY
Qty: 90 TABLET | Refills: 1 | Status: SHIPPED | OUTPATIENT
Start: 2025-02-13

## 2025-02-13 NOTE — TELEPHONE ENCOUNTER
Last refilled 11/11/24 for 90 with 0 refills. Last ov 12/2/24   Future Appointments   Date Time Provider Department Center   3/3/2025  9:30 AM [facility]

## 2025-03-03 ENCOUNTER — HOSPITAL ENCOUNTER (OUTPATIENT)
Facility: HOSPITAL | Age: 55
Setting detail: SPECIMEN
Discharge: HOME | End: 2025-03-06

## 2025-03-03 ENCOUNTER — OFFICE VISIT (OUTPATIENT)
Dept: FAMILY MEDICINE CLINIC | Facility: CLINIC | Age: 55
End: 2025-03-03
Payer: COMMERCIAL

## 2025-03-03 VITALS
TEMPERATURE: 98.6 F | WEIGHT: 197 LBS | OXYGEN SATURATION: 97 % | SYSTOLIC BLOOD PRESSURE: 118 MMHG | BODY MASS INDEX: 30.92 KG/M2 | HEIGHT: 67 IN | DIASTOLIC BLOOD PRESSURE: 70 MMHG | HEART RATE: 67 BPM | RESPIRATION RATE: 16 BRPM

## 2025-03-03 DIAGNOSIS — E11.9 TYPE 2 DIABETES MELLITUS WITHOUT COMPLICATION, WITHOUT LONG-TERM CURRENT USE OF INSULIN (HCC): Primary | ICD-10-CM

## 2025-03-03 DIAGNOSIS — Z86.0101 HX OF ADENOMATOUS POLYP OF COLON: ICD-10-CM

## 2025-03-03 DIAGNOSIS — E78.2 MIXED HYPERLIPIDEMIA: ICD-10-CM

## 2025-03-03 LAB
HBA1C MFR BLD: 5.7 %
SENTARA SPECIMEN COLLECTION: NORMAL

## 2025-03-03 PROCEDURE — 99213 OFFICE O/P EST LOW 20 MIN: CPT | Performed by: FAMILY MEDICINE

## 2025-03-03 PROCEDURE — 3044F HG A1C LEVEL LT 7.0%: CPT | Performed by: FAMILY MEDICINE

## 2025-03-03 PROCEDURE — 99001 SPECIMEN HANDLING PT-LAB: CPT

## 2025-03-03 PROCEDURE — 83036 HEMOGLOBIN GLYCOSYLATED A1C: CPT | Performed by: FAMILY MEDICINE

## 2025-03-03 RX ORDER — ROSUVASTATIN CALCIUM 40 MG/1
40 TABLET, COATED ORAL DAILY
Qty: 90 TABLET | Refills: 1 | Status: SHIPPED | OUTPATIENT
Start: 2025-03-03

## 2025-03-03 SDOH — ECONOMIC STABILITY: FOOD INSECURITY: WITHIN THE PAST 12 MONTHS, THE FOOD YOU BOUGHT JUST DIDN'T LAST AND YOU DIDN'T HAVE MONEY TO GET MORE.: NEVER TRUE

## 2025-03-03 SDOH — ECONOMIC STABILITY: FOOD INSECURITY: WITHIN THE PAST 12 MONTHS, YOU WORRIED THAT YOUR FOOD WOULD RUN OUT BEFORE YOU GOT MONEY TO BUY MORE.: NEVER TRUE

## 2025-03-03 ASSESSMENT — PATIENT HEALTH QUESTIONNAIRE - PHQ9
10. IF YOU CHECKED OFF ANY PROBLEMS, HOW DIFFICULT HAVE THESE PROBLEMS MADE IT FOR YOU TO DO YOUR WORK, TAKE CARE OF THINGS AT HOME, OR GET ALONG WITH OTHER PEOPLE: NOT DIFFICULT AT ALL
4. FEELING TIRED OR HAVING LITTLE ENERGY: NOT AT ALL
SUM OF ALL RESPONSES TO PHQ QUESTIONS 1-9: 0
2. FEELING DOWN, DEPRESSED OR HOPELESS: NOT AT ALL
6. FEELING BAD ABOUT YOURSELF - OR THAT YOU ARE A FAILURE OR HAVE LET YOURSELF OR YOUR FAMILY DOWN: NOT AT ALL
SUM OF ALL RESPONSES TO PHQ QUESTIONS 1-9: 0
1. LITTLE INTEREST OR PLEASURE IN DOING THINGS: NOT AT ALL
7. TROUBLE CONCENTRATING ON THINGS, SUCH AS READING THE NEWSPAPER OR WATCHING TELEVISION: NOT AT ALL
SUM OF ALL RESPONSES TO PHQ QUESTIONS 1-9: 0
SUM OF ALL RESPONSES TO PHQ QUESTIONS 1-9: 0
3. TROUBLE FALLING OR STAYING ASLEEP: NOT AT ALL
9. THOUGHTS THAT YOU WOULD BE BETTER OFF DEAD, OR OF HURTING YOURSELF: NOT AT ALL
5. POOR APPETITE OR OVEREATING: NOT AT ALL
8. MOVING OR SPEAKING SO SLOWLY THAT OTHER PEOPLE COULD HAVE NOTICED. OR THE OPPOSITE, BEING SO FIGETY OR RESTLESS THAT YOU HAVE BEEN MOVING AROUND A LOT MORE THAN USUAL: NOT AT ALL

## 2025-03-03 NOTE — PATIENT INSTRUCTIONS
Current Status:  Type 2 diabetes well-controlled with a hemoglobin A1c of 5.7  Status of hyperlipidemia to be determined pending lab results  History of colonic tubular adenomas with colonoscopy due since August 2024    Health Maintenance Recommendations:  Influenza recommended every fall  Hepatitis B immunization #2?  PCV-20 pneumococcal immunization  COVID-19 immunization booster-available at the pharmacy  Mammogram due July 2025  Pap smear due August 2025  Diabetic eye exam  Colonoscopy due since August 2024-referred    Plan:  Lab today (lipid panel)  Begin rosuvastatin 40 mg daily  Return for lab (lipid panel, ALT, AST) in 2-month  Avoid dietary starch and sugar and as much as possible follow program of regular aerobic exercise.  Please schedule a lab appointment followed by an annual physical exam appointment after 11/20/2025, return sooner with any problems  Please always arrive at least 15 minutes before your scheduled appointment time.

## 2025-03-03 NOTE — PROGRESS NOTES
Noni Arango is a 54 y.o. female (: 1970) presenting to address:    Chief Complaint   Patient presents with    Headache     Allergy med and spray not helping .        There were no vitals filed for this visit.    \"Have you been to the ER, urgent care clinic since your last visit?  Hospitalized since your last visit?\"    NO    “Have you seen or consulted any other health care providers outside of [facility] since your last visit?”    NO    “Have you had a colorectal cancer screening such as a colonoscopy/FIT/Cologuard?    NO    Date of last Colonoscopy: 2019  No cologuard on file  No FIT/FOBT on file   No flexible sigmoidoscopy on file              
  Pulmonary:      Effort: Pulmonary effort is normal.   Neurological:      Mental Status: She is alert.   Psychiatric:         Mood and Affect: Mood normal.         Behavior: Behavior normal.          ASSESSMENT and PLAN    1. Type 2 diabetes mellitus without complication, without long-term current use of insulin (HCC)  -     AMB POC HEMOGLOBIN A1C  2. Mixed hyperlipidemia  -     rosuvastatin (CRESTOR) 40 MG tablet; Take 1 tablet by mouth daily, Disp-90 tablet, R-1Normal  3. Hx of adenomatous polyp of colon    Current Status:  Type 2 diabetes well-controlled with a hemoglobin A1c of 5.7  Status of hyperlipidemia to be determined pending lab results  History of colonic tubular adenomas with colonoscopy due since August 2024    Health Maintenance Recommendations:  Influenza recommended every fall  Hepatitis B immunization #2?  Deferred for now  PCV-20 pneumococcal immunization deferred for now  COVID-19 immunization booster-available at the pharmacy  Mammogram due July 2025  Pap smear due August 2025  Diabetic eye exam  Colonoscopy due since August 2024-referred    Plan:  Lab today (lipid panel)  Begin rosuvastatin 40 mg daily  Return for lab (lipid panel, ALT, AST) in 2-month  Avoid dietary starch and sugar and as much as possible follow program of regular aerobic exercise.  Please schedule a lab appointment followed by an annual physical exam appointment after 11/20/2025, return sooner with any problems  Please always arrive at least 15 minutes before your scheduled appointment time.      Rachid Thompson MD    Please Note:  This document has been produced using voice recognition software.  Unrecognized errors in transcription may be present.

## 2025-03-04 LAB
A/G RATIO: 1.5 RATIO (ref 1.1–2.6)
ALBUMIN: 4.3 G/DL (ref 3.5–5)
ALP BLD-CCNC: 63 U/L (ref 25–115)
ALT SERPL-CCNC: 25 U/L (ref 5–40)
ANION GAP SERPL CALCULATED.3IONS-SCNC: 9 MMOL/L (ref 3–15)
AST SERPL-CCNC: 10 U/L (ref 10–37)
BACTERIA, URINE: PRESENT
BASOPHILS # BLD: 1 % (ref 0–2)
BASOPHILS ABSOLUTE: 0.1 K/UL (ref 0–0.2)
BILIRUB SERPL-MCNC: 0.4 MG/DL (ref 0.2–1.2)
BILIRUBIN, URINE: NEGATIVE
BUN BLDV-MCNC: 13 MG/DL (ref 6–22)
CALCIUM SERPL-MCNC: 9.4 MG/DL (ref 8.4–10.5)
CHLORIDE BLD-SCNC: 104 MMOL/L (ref 98–110)
CHOLESTEROL, TOTAL: 188 MG/DL (ref 110–200)
CHOLESTEROL/HDL RATIO: 4.2 (ref 0–5)
CLARITY, UA: ABNORMAL
CO2: 28 MMOL/L (ref 20–32)
COLOR, UA: YELLOW
CREAT SERPL-MCNC: 0.7 MG/DL (ref 0.5–1.2)
EOSINOPHIL # BLD: 4 % (ref 0–6)
EOSINOPHILS ABSOLUTE: 0.2 K/UL (ref 0–0.5)
ESTIMATED AVERAGE GLUCOSE: 122 MG/DL (ref 91–123)
GFR, ESTIMATED: >60 ML/MIN/1.73 SQ.M.
GLOBULIN: 2.8 G/DL (ref 2–4)
GLUCOSE URINE: NEGATIVE MG/DL
GLUCOSE: 75 MG/DL (ref 70–99)
HBA1C MFR BLD: 5.9 % (ref 4.8–5.6)
HCT VFR BLD CALC: 45.4 % (ref 35.1–48)
HDLC SERPL-MCNC: 45 MG/DL
HEMOGLOBIN: 14.5 G/DL (ref 11.7–16)
HYALINE CASTS: ABNORMAL /LPF (ref 0–2)
KETONES, URINE: NEGATIVE MG/DL
LDL CHOLESTEROL: 124 MG/DL (ref 50–99)
LDL/HDL RATIO: 2.8
LEUKOCYTE ESTERASE, URINE: ABNORMAL
LYMPHOCYTES # BLD: 25 % (ref 20–45)
LYMPHOCYTES ABSOLUTE: 1.7 K/UL (ref 1–4.8)
MCH RBC QN AUTO: 31 PG (ref 26–34)
MCHC RBC AUTO-ENTMCNC: 32 G/DL (ref 31–36)
MCV RBC AUTO: 95 FL (ref 80–99)
MONOCYTES ABSOLUTE: 0.6 K/UL (ref 0.1–1)
MONOCYTES: 9 % (ref 3–12)
NEUTROPHILS ABSOLUTE: 4.1 K/UL (ref 1.8–7.7)
NEUTROPHILS SEGMENTED: 61 % (ref 40–75)
NITRITE, URINE: NEGATIVE
NON-HDL CHOLESTEROL: 143 MG/DL
OCCULT BLOOD,URINE: NEGATIVE
PDW BLD-RTO: 13.8 % (ref 10–15.5)
PH, URINE: 5.5 PH (ref 5–8)
PLATELET # BLD: 255 K/UL (ref 140–440)
PMV BLD AUTO: 12.4 FL (ref 9–13)
POTASSIUM SERPL-SCNC: 5.2 MMOL/L (ref 3.5–5.5)
PROTEIN, URINE: NEGATIVE MG/DL
RBC # BLD: 4.76 M/UL (ref 3.8–5.2)
RBC URINE: ABNORMAL /HPF
SODIUM BLD-SCNC: 141 MMOL/L (ref 133–145)
SPECIFIC GRAVITY UA: 1.02 (ref 1–1.03)
SQUAMOUS EPITHELIAL CELLS: >20 /HPF
T4 FREE: 1.1 NG/DL (ref 0.9–1.8)
TOTAL PROTEIN: 7.1 G/DL (ref 6.4–8.3)
TRIGL SERPL-MCNC: 91 MG/DL (ref 40–149)
TSH SERPL DL<=0.05 MIU/L-ACNC: 0.79 MCU/ML (ref 0.27–4.2)
UROBILINOGEN, URINE: 0.2 MG/DL
VLDLC SERPL CALC-MCNC: 18 MG/DL (ref 8–30)
WBC # BLD: 6.7 K/UL (ref 4–11)
WBC URINE: ABNORMAL /HPF (ref 0–5)

## 2025-04-27 DIAGNOSIS — E78.2 MIXED HYPERLIPIDEMIA: Primary | ICD-10-CM

## 2025-04-27 DIAGNOSIS — Z79.899 ON STATIN THERAPY: ICD-10-CM

## 2025-05-02 ENCOUNTER — LAB (OUTPATIENT)
Dept: FAMILY MEDICINE CLINIC | Facility: CLINIC | Age: 55
End: 2025-05-02

## 2025-05-02 ENCOUNTER — RESULTS FOLLOW-UP (OUTPATIENT)
Dept: FAMILY MEDICINE CLINIC | Facility: CLINIC | Age: 55
End: 2025-05-02

## 2025-05-02 ENCOUNTER — HOSPITAL ENCOUNTER (OUTPATIENT)
Facility: HOSPITAL | Age: 55
Setting detail: SPECIMEN
Discharge: HOME | End: 2025-05-05

## 2025-05-02 DIAGNOSIS — E78.2 MIXED HYPERLIPIDEMIA: ICD-10-CM

## 2025-05-02 DIAGNOSIS — Z79.899 ON STATIN THERAPY: ICD-10-CM

## 2025-05-02 LAB
ALT SERPL-CCNC: 17 U/L (ref 5–40)
AST SERPL-CCNC: 18 U/L (ref 10–37)
CHOLESTEROL, TOTAL: 122 MG/DL (ref 110–200)
CHOLESTEROL/HDL RATIO: 2.5 (ref 0–5)
HDLC SERPL-MCNC: 48 MG/DL
LDL CHOLESTEROL: 64 MG/DL (ref 50–99)
LDL/HDL RATIO: 1.3
NON-HDL CHOLESTEROL: 74 MG/DL
TRIGL SERPL-MCNC: 49 MG/DL (ref 40–149)
VLDLC SERPL CALC-MCNC: 10 MG/DL (ref 8–30)

## 2025-05-02 PROCEDURE — 99001 SPECIMEN HANDLING PT-LAB: CPT

## 2025-05-03 LAB — SENTARA SPECIMEN COLLECTION: NORMAL

## 2025-08-27 RX ORDER — SERTRALINE HYDROCHLORIDE 100 MG/1
100 TABLET, FILM COATED ORAL NIGHTLY
Qty: 90 TABLET | Refills: 2 | Status: SHIPPED | OUTPATIENT
Start: 2025-08-27